# Patient Record
Sex: FEMALE | Race: WHITE | Employment: OTHER | URBAN - NONMETROPOLITAN AREA
[De-identification: names, ages, dates, MRNs, and addresses within clinical notes are randomized per-mention and may not be internally consistent; named-entity substitution may affect disease eponyms.]

---

## 2017-11-09 ENCOUNTER — HOSPITAL ENCOUNTER (EMERGENCY)
Age: 57
Discharge: HOME | End: 2017-11-09
Attending: EMERGENCY MEDICINE
Payer: COMMERCIAL

## 2017-11-09 ENCOUNTER — APPOINTMENT (OUTPATIENT)
Dept: GENERAL RADIOLOGY | Age: 57
End: 2017-11-09
Payer: COMMERCIAL

## 2017-11-09 VITALS
TEMPERATURE: 97.4 F | DIASTOLIC BLOOD PRESSURE: 88 MMHG | HEART RATE: 87 BPM | SYSTOLIC BLOOD PRESSURE: 145 MMHG | WEIGHT: 135 LBS | HEIGHT: 65 IN | BODY MASS INDEX: 22.49 KG/M2 | OXYGEN SATURATION: 97 % | RESPIRATION RATE: 18 BRPM

## 2017-11-09 DIAGNOSIS — M54.40 BILATERAL LOW BACK PAIN WITH SCIATICA, SCIATICA LATERALITY UNSPECIFIED, UNSPECIFIED CHRONICITY: ICD-10-CM

## 2017-11-09 DIAGNOSIS — R06.00 DYSPNEA, UNSPECIFIED TYPE: Primary | ICD-10-CM

## 2017-11-09 LAB
ALBUMIN SERPL-MCNC: 4.1 GM/DL (ref 3.5–5)
ALP BLD-CCNC: 69 U/L (ref 46–116)
ALT SERPL-CCNC: 29 U/L (ref 12–78)
ANION GAP: 10 MEQ/L (ref 8–16)
AST SERPL-CCNC: 22 U/L (ref 15–37)
BASOPHILS # BLD: 0.7 % (ref 0–3)
BILIRUB SERPL-MCNC: 0.3 MG/DL (ref 0.2–1)
BUN BLDV-MCNC: 13 MG/DL (ref 7–18)
CHLORIDE BLD-SCNC: 104 MEQ/L (ref 98–107)
CO2: 26 MEQ/L (ref 21–32)
CREAT SERPL-MCNC: 0.8 MG/DL (ref 0.6–1.1)
EKG ATRIAL RATE: 96 BPM
EKG P AXIS: 20 DEGREES
EKG P-R INTERVAL: 168 MS
EKG Q-T INTERVAL: 356 MS
EKG QRS DURATION: 70 MS
EKG QTC CALCULATION (BAZETT): 449 MS
EKG R AXIS: 80 DEGREES
EKG T AXIS: 39 DEGREES
EKG VENTRICULAR RATE: 96 BPM
EOSINOPHILS RELATIVE PERCENT: 3.3 % (ref 0–4)
FLU A ANTIGEN: NEGATIVE
FLU B ANTIGEN: NEGATIVE
GFR, ESTIMATED: 79 ML/MIN/1.73M2
GLUCOSE BLD-MCNC: 101 MG/DL (ref 74–106)
HCT VFR BLD CALC: 43.7 % (ref 37–47)
HEMOGLOBIN: 14.5 GM/DL (ref 12–16)
LYMPHOCYTES # BLD: 31.6 % (ref 15–47)
MAGNESIUM: 2.1 MG/DL (ref 1.8–2.4)
MCH RBC QN AUTO: 28.9 PG (ref 27–31)
MCHC RBC AUTO-ENTMCNC: 33.1 GM/DL (ref 33–37)
MCV RBC AUTO: 87.5 FL (ref 81–99)
MONOCYTES: 4.1 % (ref 0–12)
PDW BLD-RTO: 11.4 % (ref 11.5–14.5)
PLATELET # BLD: 390 THOU/MM3 (ref 130–400)
PMV BLD AUTO: 7.4 MCM (ref 7.4–10.4)
POC CALCIUM: 9.2 MG/DL (ref 8.5–10.1)
POTASSIUM SERPL-SCNC: 4.1 MEQ/L (ref 3.5–5.1)
RBC # BLD: 4.99 MILL/MM3 (ref 4.2–5.4)
SEGS: 60.3 % (ref 43–75)
SODIUM BLD-SCNC: 140 MEQ/L (ref 136–145)
TOTAL PROTEIN: 7.6 GM/DL (ref 6.4–8.2)
TROPONIN I: < 0.04 NG/ML
WBC # BLD: 10.1 THOU/MM3 (ref 4.8–10.8)

## 2017-11-09 PROCEDURE — 36415 COLL VENOUS BLD VENIPUNCTURE: CPT

## 2017-11-09 PROCEDURE — 84484 ASSAY OF TROPONIN QUANT: CPT

## 2017-11-09 PROCEDURE — 6370000000 HC RX 637 (ALT 250 FOR IP): Performed by: EMERGENCY MEDICINE

## 2017-11-09 PROCEDURE — 83735 ASSAY OF MAGNESIUM: CPT

## 2017-11-09 PROCEDURE — 93005 ELECTROCARDIOGRAM TRACING: CPT

## 2017-11-09 PROCEDURE — 2580000003 HC RX 258: Performed by: EMERGENCY MEDICINE

## 2017-11-09 PROCEDURE — 85025 COMPLETE CBC W/AUTO DIFF WBC: CPT

## 2017-11-09 PROCEDURE — 80053 COMPREHEN METABOLIC PANEL: CPT

## 2017-11-09 PROCEDURE — 96375 TX/PRO/DX INJ NEW DRUG ADDON: CPT

## 2017-11-09 PROCEDURE — 99285 EMERGENCY DEPT VISIT HI MDM: CPT

## 2017-11-09 PROCEDURE — 87804 INFLUENZA ASSAY W/OPTIC: CPT

## 2017-11-09 PROCEDURE — 71020 XR CHEST STANDARD TWO VW: CPT

## 2017-11-09 PROCEDURE — 96374 THER/PROPH/DIAG INJ IV PUSH: CPT

## 2017-11-09 PROCEDURE — 6360000002 HC RX W HCPCS: Performed by: EMERGENCY MEDICINE

## 2017-11-09 RX ORDER — MECLIZINE HYDROCHLORIDE 25 MG/1
25 TABLET ORAL 3 TIMES DAILY PRN
Qty: 15 TABLET | Refills: 0 | Status: SHIPPED | OUTPATIENT
Start: 2017-11-09 | End: 2017-11-19

## 2017-11-09 RX ORDER — PREDNISONE 20 MG/1
TABLET ORAL
Qty: 9 TABLET | Refills: 0 | Status: SHIPPED | OUTPATIENT
Start: 2017-11-09 | End: 2018-04-26

## 2017-11-09 RX ORDER — ONDANSETRON 2 MG/ML
4 INJECTION INTRAMUSCULAR; INTRAVENOUS ONCE
Status: COMPLETED | OUTPATIENT
Start: 2017-11-09 | End: 2017-11-09

## 2017-11-09 RX ORDER — ONDANSETRON 4 MG/1
4 TABLET, FILM COATED ORAL EVERY 8 HOURS PRN
Qty: 9 TABLET | Refills: 0 | Status: SHIPPED | OUTPATIENT
Start: 2017-11-09 | End: 2018-04-26

## 2017-11-09 RX ORDER — MECLIZINE HYDROCHLORIDE CHEWABLE TABLETS 25 MG/1
25 TABLET, CHEWABLE ORAL ONCE
Status: COMPLETED | OUTPATIENT
Start: 2017-11-09 | End: 2017-11-09

## 2017-11-09 RX ORDER — METHYLPREDNISOLONE SODIUM SUCCINATE 125 MG/2ML
125 INJECTION, POWDER, LYOPHILIZED, FOR SOLUTION INTRAMUSCULAR; INTRAVENOUS ONCE
Status: COMPLETED | OUTPATIENT
Start: 2017-11-09 | End: 2017-11-09

## 2017-11-09 RX ORDER — 0.9 % SODIUM CHLORIDE 0.9 %
1000 INTRAVENOUS SOLUTION INTRAVENOUS ONCE
Status: COMPLETED | OUTPATIENT
Start: 2017-11-09 | End: 2017-11-09

## 2017-11-09 RX ADMIN — METHYLPREDNISOLONE SODIUM SUCCINATE 125 MG: 125 INJECTION, POWDER, FOR SOLUTION INTRAMUSCULAR; INTRAVENOUS at 15:05

## 2017-11-09 RX ADMIN — MECLIZINE HCL 25 MG: 25 TABLET, CHEWABLE ORAL at 13:53

## 2017-11-09 RX ADMIN — ONDANSETRON 4 MG: 2 INJECTION INTRAMUSCULAR; INTRAVENOUS at 13:53

## 2017-11-09 RX ADMIN — SODIUM CHLORIDE 1000 ML: 9 INJECTION, SOLUTION INTRAVENOUS at 13:52

## 2017-11-09 ASSESSMENT — PAIN DESCRIPTION - DESCRIPTORS: DESCRIPTORS: ACHING

## 2017-11-09 ASSESSMENT — PAIN DESCRIPTION - ORIENTATION: ORIENTATION: LEFT

## 2017-11-09 ASSESSMENT — PAIN DESCRIPTION - LOCATION: LOCATION: RIB CAGE

## 2017-11-09 ASSESSMENT — ENCOUNTER SYMPTOMS
ABDOMINAL PAIN: 0
DIARRHEA: 0
SORE THROAT: 0
WHEEZING: 0
COUGH: 1
VOMITING: 1
NAUSEA: 1

## 2017-11-09 ASSESSMENT — PAIN DESCRIPTION - PAIN TYPE: TYPE: ACUTE PAIN

## 2017-11-09 ASSESSMENT — PAIN SCALES - GENERAL: PAINLEVEL_OUTOF10: 6

## 2017-11-09 NOTE — ED NOTES
Pt resting, resp even and unlabored, skin pink, warm and dry. Pt denies chest pressure at this time. IV discontinued, 1000ml's infused. Pt states she feels better. Pt given discharge instructions and verbalizes understanding, pt released.       Wiley George, RN  11/09/17 71 Angel Cuevas, RN  11/09/17 0363

## 2017-11-09 NOTE — ED PROVIDER NOTES
feel there is at least some component of narcotic withdrawal, as she has been on regular narcotic medication for over a year, and now has had none for 4-5 days. She disagrees. No other source of her symptoms has currently been found. I have encouraged her to rest, and remain well hydrated, and discuss her pain management with her PCP. She is experiencing increased low back pain now, and has been taking \"a lot of Ibuprofen and Aspirin\" for her pain. I cautioned her about excessive dosing of NSAID's. She doesn't want to take acetaminophen. I told her I would give her symptomatic care, and give her a short course of oral steroids for her low back pain. All other management per her doctor. FINAL IMPRESSION      1. Dyspnea, unspecified type    2. Bilateral low back pain with sciatica, sciatica laterality unspecified, unspecified chronicity        DISPOSITION/PLAN    DISPOSITION Decision to Discharge    PATIENT REFERRED TO:    Christine French [address]  [phone]    Schedule an appointment as soon as possible for a visit         DISCHARGE MEDICATIONS:    New Prescriptions    MECLIZINE (ANTIVERT) 25 MG TABLET    Take 1 tablet by mouth 3 times daily as needed for Dizziness    ONDANSETRON (ZOFRAN) 4 MG TABLET    Take 1 tablet by mouth every 8 hours as needed for Nausea or Vomiting    PREDNISONE (DELTASONE) 20 MG TABLET    2 daily for 3 days, then 1 daily for 3 days for inflammation.           (Please note that portions of this note were completed with a voice recognition program.  Efforts were made to edit the dictations but occasionally words are mis-transcribed.)      Francine Ortega MD  11/09/17 1289

## 2017-11-09 NOTE — ED NOTES
Pt complains of being \"unable to get her breath. \" Pt states she didn't feel well yesterday and today feels like she can't get her breath. Pt also complains of chest pressure, dizziness and l rib cage pain. Pt denies diaphoresis, fever, nausea or vomiting. Pt alert, resp even and unlabored, skin pale, warm and dry, no pedal edema noted.      Kelly Tucker RN  11/09/17 2536

## 2018-04-11 RX ORDER — METHYLPREDNISOLONE ACETATE 80 MG/ML
80 INJECTION, SUSPENSION INTRA-ARTICULAR; INTRALESIONAL; INTRAMUSCULAR; SOFT TISSUE ONCE
Status: CANCELLED | OUTPATIENT
Start: 2018-04-11 | End: 2018-04-11

## 2018-04-25 RX ORDER — METHYLPREDNISOLONE ACETATE 80 MG/ML
80 INJECTION, SUSPENSION INTRA-ARTICULAR; INTRALESIONAL; INTRAMUSCULAR; SOFT TISSUE ONCE
Status: CANCELLED | OUTPATIENT
Start: 2018-04-25 | End: 2018-04-25

## 2018-04-26 ENCOUNTER — HOSPITAL ENCOUNTER (OUTPATIENT)
Dept: INTERVENTIONAL RADIOLOGY/VASCULAR | Age: 58
Discharge: HOME | End: 2018-04-26
Payer: COMMERCIAL

## 2018-04-26 VITALS
TEMPERATURE: 97.7 F | BODY MASS INDEX: 23.46 KG/M2 | SYSTOLIC BLOOD PRESSURE: 122 MMHG | OXYGEN SATURATION: 98 % | WEIGHT: 141 LBS | HEART RATE: 76 BPM | DIASTOLIC BLOOD PRESSURE: 83 MMHG

## 2018-04-26 PROCEDURE — 2580000003 HC RX 258

## 2018-04-26 PROCEDURE — 6360000002 HC RX W HCPCS

## 2018-04-26 PROCEDURE — 6360000004 HC RX CONTRAST MEDICATION: Performed by: RADIOLOGY

## 2018-04-26 PROCEDURE — 2500000003 HC RX 250 WO HCPCS

## 2018-04-26 PROCEDURE — 62321 NJX INTERLAMINAR CRV/THRC: CPT | Performed by: RADIOLOGY

## 2018-04-26 RX ORDER — METHYLPREDNISOLONE ACETATE 80 MG/ML
80 INJECTION, SUSPENSION INTRA-ARTICULAR; INTRALESIONAL; INTRAMUSCULAR; SOFT TISSUE ONCE
Status: COMPLETED | OUTPATIENT
Start: 2018-04-26 | End: 2018-04-26

## 2018-04-26 RX ORDER — SERTRALINE HYDROCHLORIDE 25 MG/1
100 TABLET, FILM COATED ORAL DAILY
COMMUNITY

## 2018-04-26 RX ADMIN — METHYLPREDNISOLONE ACETATE 80 MG: 80 INJECTION, SUSPENSION INTRA-ARTICULAR; INTRALESIONAL; INTRAMUSCULAR; SOFT TISSUE at 08:27

## 2018-04-26 RX ADMIN — IOHEXOL 1 ML: 180 INJECTION INTRAVENOUS at 08:27

## 2018-04-26 RX ADMIN — Medication 1 ML: at 08:27

## 2018-04-26 ASSESSMENT — PAIN DESCRIPTION - DESCRIPTORS
DESCRIPTORS: TIGHTNESS
DESCRIPTORS: TIGHTNESS
DESCRIPTORS: CONSTANT;ACHING;STABBING;SHARP

## 2018-04-26 ASSESSMENT — PAIN SCALES - GENERAL: PAINLEVEL_OUTOF10: 2

## 2018-04-26 ASSESSMENT — PAIN DESCRIPTION - LOCATION
LOCATION: NECK;SHOULDER
LOCATION: NECK;SHOULDER

## 2018-04-26 ASSESSMENT — PAIN DESCRIPTION - PAIN TYPE
TYPE: CHRONIC PAIN
TYPE: CHRONIC PAIN

## 2018-04-26 ASSESSMENT — PAIN - FUNCTIONAL ASSESSMENT: PAIN_FUNCTIONAL_ASSESSMENT: 0-10

## 2018-04-26 ASSESSMENT — PAIN DESCRIPTION - ORIENTATION: ORIENTATION: RIGHT

## 2019-12-04 ENCOUNTER — HOSPITAL ENCOUNTER (OUTPATIENT)
Age: 59
Discharge: HOME | End: 2019-12-04
Payer: COMMERCIAL

## 2019-12-04 ENCOUNTER — HOSPITAL ENCOUNTER (OUTPATIENT)
Dept: GENERAL RADIOLOGY | Age: 59
Discharge: HOME | End: 2019-12-04
Payer: COMMERCIAL

## 2019-12-04 DIAGNOSIS — M43.16 SPONDYLOLISTHESIS AT L4-L5 LEVEL: ICD-10-CM

## 2019-12-04 DIAGNOSIS — Z01.811 PRE-OP CHEST EXAM: ICD-10-CM

## 2019-12-04 LAB
ALBUMIN SERPL-MCNC: 4.5 G/DL (ref 3.5–5.1)
ANION GAP SERPL CALCULATED.3IONS-SCNC: 12 MEQ/L (ref 8–16)
BASOPHILS # BLD: 0.8 %
BASOPHILS ABSOLUTE: 0.1 THOU/MM3 (ref 0–0.1)
BUN BLDV-MCNC: 14 MG/DL (ref 7–22)
CALCIUM SERPL-MCNC: 9.5 MG/DL (ref 8.5–10.5)
CHLORIDE BLD-SCNC: 100 MEQ/L (ref 98–111)
CO2: 27 MEQ/L (ref 23–33)
CREAT SERPL-MCNC: 0.8 MG/DL (ref 0.4–1.2)
EKG ATRIAL RATE: 82 BPM
EKG P AXIS: 63 DEGREES
EKG P-R INTERVAL: 200 MS
EKG Q-T INTERVAL: 366 MS
EKG QRS DURATION: 74 MS
EKG QTC CALCULATION (BAZETT): 427 MS
EKG R AXIS: 85 DEGREES
EKG T AXIS: 67 DEGREES
EKG VENTRICULAR RATE: 82 BPM
EOSINOPHIL # BLD: 3.7 %
EOSINOPHILS ABSOLUTE: 0.3 THOU/MM3 (ref 0–0.4)
ERYTHROCYTE [DISTWIDTH] IN BLOOD BY AUTOMATED COUNT: 12.6 % (ref 11.5–14.5)
ERYTHROCYTE [DISTWIDTH] IN BLOOD BY AUTOMATED COUNT: 42.8 FL (ref 35–45)
GFR SERPL CREATININE-BSD FRML MDRD: 74 ML/MIN/1.73M2
GLUCOSE BLD-MCNC: 91 MG/DL (ref 70–108)
HCT VFR BLD CALC: 44.4 % (ref 37–47)
HEMOGLOBIN: 14.1 GM/DL (ref 12–16)
IMMATURE GRANS (ABS): 0.02 THOU/MM3 (ref 0–0.07)
IMMATURE GRANULOCYTES: 0.2 %
LYMPHOCYTES # BLD: 36.2 %
LYMPHOCYTES ABSOLUTE: 3 THOU/MM3 (ref 1–4.8)
MCH RBC QN AUTO: 29.3 PG (ref 26–33)
MCHC RBC AUTO-ENTMCNC: 31.8 GM/DL (ref 32.2–35.5)
MCV RBC AUTO: 92.3 FL (ref 81–99)
MONOCYTES # BLD: 5 %
MONOCYTES ABSOLUTE: 0.4 THOU/MM3 (ref 0.4–1.3)
MRSA SCREEN RT-PCR: NEGATIVE
NUCLEATED RED BLOOD CELLS: 0 /100 WBC
PLATELET # BLD: 338 THOU/MM3 (ref 130–400)
PMV BLD AUTO: 9.3 FL (ref 9.4–12.4)
POTASSIUM SERPL-SCNC: 4.3 MEQ/L (ref 3.5–5.2)
PREALBUMIN: 32.4 MG/DL (ref 20–40)
RBC # BLD: 4.81 MILL/MM3 (ref 4.2–5.4)
SEG NEUTROPHILS: 54.1 %
SEGMENTED NEUTROPHILS ABSOLUTE COUNT: 4.5 THOU/MM3 (ref 1.8–7.7)
SODIUM BLD-SCNC: 139 MEQ/L (ref 135–145)
WBC # BLD: 8.3 THOU/MM3 (ref 4.8–10.8)

## 2019-12-04 PROCEDURE — 93010 ELECTROCARDIOGRAM REPORT: CPT | Performed by: INTERNAL MEDICINE

## 2019-12-04 PROCEDURE — 85025 COMPLETE CBC W/AUTO DIFF WBC: CPT

## 2019-12-04 PROCEDURE — 93005 ELECTROCARDIOGRAM TRACING: CPT | Performed by: PHYSICIAN ASSISTANT

## 2019-12-04 PROCEDURE — 84134 ASSAY OF PREALBUMIN: CPT

## 2019-12-04 PROCEDURE — 82040 ASSAY OF SERUM ALBUMIN: CPT

## 2019-12-04 PROCEDURE — 80048 BASIC METABOLIC PNL TOTAL CA: CPT

## 2019-12-04 PROCEDURE — 36415 COLL VENOUS BLD VENIPUNCTURE: CPT

## 2019-12-04 PROCEDURE — 71046 X-RAY EXAM CHEST 2 VIEWS: CPT

## 2019-12-04 PROCEDURE — 87641 MR-STAPH DNA AMP PROBE: CPT

## 2019-12-17 ENCOUNTER — HOSPITAL ENCOUNTER (EMERGENCY)
Age: 59
Discharge: HOME | DRG: 029 | End: 2019-12-17
Attending: EMERGENCY MEDICINE
Payer: COMMERCIAL

## 2019-12-17 VITALS
TEMPERATURE: 98.1 F | SYSTOLIC BLOOD PRESSURE: 97 MMHG | HEART RATE: 76 BPM | WEIGHT: 127 LBS | HEIGHT: 65 IN | BODY MASS INDEX: 21.16 KG/M2 | OXYGEN SATURATION: 94 % | RESPIRATION RATE: 16 BRPM | DIASTOLIC BLOOD PRESSURE: 77 MMHG

## 2019-12-17 PROCEDURE — 6360000002 HC RX W HCPCS: Performed by: EMERGENCY MEDICINE

## 2019-12-17 PROCEDURE — 6370000000 HC RX 637 (ALT 250 FOR IP): Performed by: EMERGENCY MEDICINE

## 2019-12-17 PROCEDURE — 96374 THER/PROPH/DIAG INJ IV PUSH: CPT

## 2019-12-17 PROCEDURE — 96372 THER/PROPH/DIAG INJ SC/IM: CPT

## 2019-12-17 PROCEDURE — 99284 EMERGENCY DEPT VISIT MOD MDM: CPT

## 2019-12-17 RX ORDER — DIPHENHYDRAMINE HYDROCHLORIDE 50 MG/ML
50 INJECTION INTRAMUSCULAR; INTRAVENOUS ONCE
Status: COMPLETED | OUTPATIENT
Start: 2019-12-17 | End: 2019-12-17

## 2019-12-17 RX ORDER — PROMETHAZINE HYDROCHLORIDE 25 MG/ML
6.25 INJECTION, SOLUTION INTRAMUSCULAR; INTRAVENOUS ONCE
Status: COMPLETED | OUTPATIENT
Start: 2019-12-17 | End: 2019-12-17

## 2019-12-17 RX ORDER — OXYCODONE HYDROCHLORIDE AND ACETAMINOPHEN 5; 325 MG/1; MG/1
1 TABLET ORAL ONCE
Status: COMPLETED | OUTPATIENT
Start: 2019-12-17 | End: 2019-12-17

## 2019-12-17 RX ORDER — MAGNESIUM SULFATE IN WATER 40 MG/ML
2 INJECTION, SOLUTION INTRAVENOUS ONCE
Status: COMPLETED | OUTPATIENT
Start: 2019-12-17 | End: 2019-12-17

## 2019-12-17 RX ORDER — DOCUSATE SODIUM 100 MG/1
100 CAPSULE, LIQUID FILLED ORAL 2 TIMES DAILY
COMMUNITY

## 2019-12-17 RX ADMIN — OXYCODONE HYDROCHLORIDE AND ACETAMINOPHEN 1 TABLET: 5; 325 TABLET ORAL at 19:38

## 2019-12-17 RX ADMIN — MAGNESIUM SULFATE HEPTAHYDRATE 2 G: 40 INJECTION, SOLUTION INTRAVENOUS at 17:34

## 2019-12-17 RX ADMIN — PROMETHAZINE HYDROCHLORIDE 6.25 MG: 25 INJECTION INTRAMUSCULAR; INTRAVENOUS at 17:14

## 2019-12-17 RX ADMIN — DIPHENHYDRAMINE HYDROCHLORIDE 50 MG: 50 INJECTION INTRAMUSCULAR; INTRAVENOUS at 17:11

## 2019-12-17 ASSESSMENT — ENCOUNTER SYMPTOMS
PHOTOPHOBIA: 1
BLOOD IN STOOL: 0
DIARRHEA: 0
SINUS PRESSURE: 0
EYE PAIN: 0
SINUS PAIN: 0
NAUSEA: 1
ABDOMINAL PAIN: 0
CONSTIPATION: 0
CHEST TIGHTNESS: 0
BACK PAIN: 0
SHORTNESS OF BREATH: 0
COUGH: 0
RECTAL PAIN: 0
VOMITING: 1

## 2019-12-17 ASSESSMENT — PAIN DESCRIPTION - PAIN TYPE: TYPE: ACUTE PAIN

## 2019-12-17 ASSESSMENT — PAIN SCALES - GENERAL
PAINLEVEL_OUTOF10: 9
PAINLEVEL_OUTOF10: 4

## 2019-12-17 ASSESSMENT — PAIN DESCRIPTION - LOCATION: LOCATION: BACK;HEAD

## 2019-12-17 NOTE — ED NOTES
Bed: 016A  Expected date: 12/17/19  Expected time:   Means of arrival:   Comments:      Karen Joshua RN  12/17/19 2521

## 2019-12-17 NOTE — ED PROVIDER NOTES
file (likely too young). Social History Narrative    Not on file     Social History     Tobacco Use    Smoking status: Current Some Day Smoker     Packs/day: 0.50     Types: Cigarettes    Smokeless tobacco: Never Used    Tobacco comment: 1/2 pack to 1 pack a day    Substance Use Topics    Alcohol use: Yes     Alcohol/week: 0.0 standard drinks     Comment: Socially    Drug use: No         ALLERGIES     Allergies   Allergen Reactions    Erythromycin      ABD CRAMPS    Pcn [Penicillins]          FAMILY HISTORY     Family History   Problem Relation Age of Onset    Cancer Mother         Sol Vizcarra Cancer Father         prostate    Heart Attack Father          PREVIOUS RECORDS   Previous records reviewed: Patient was last seen within the department on 11/09/2017 for dyspnea. PHYSICAL EXAM     Vitals:    12/17/19 1723   BP: 111/78   Pulse: 83   Resp: 16   Temp:    SpO2: 92%     Vital signs and nursing assessment reviewed and normal. Pulsoximetry is normal per my interpretation. Physical Exam  Vitals signs and nursing note reviewed. Constitutional:       General: She is not in acute distress. Appearance: Normal appearance. She is well-developed. HENT:      Head: Normocephalic and atraumatic. Right Ear: External ear normal.      Left Ear: External ear normal.      Nose: Nose normal.      Mouth/Throat:      Mouth: Mucous membranes are moist.   Eyes:      Conjunctiva/sclera: Conjunctivae normal.      Pupils: Pupils are equal, round, and reactive to light. Neck:      Musculoskeletal: Neck supple. Vascular: No JVD. Cardiovascular:      Rate and Rhythm: Normal rate and regular rhythm. Heart sounds: Normal heart sounds. No murmur. No friction rub. No gallop. Pulmonary:      Effort: Pulmonary effort is normal.      Breath sounds: Normal breath sounds. No stridor. No wheezing or rales.    Musculoskeletal:      Comments: Patient has a sterile dressing on her lumbar spine without

## 2019-12-17 NOTE — ED TRIAGE NOTES
Pt presents to the ED via EMS with c/o migraine that has progressively gotten worse since Sunday. Pt had lumbar back surgery last week and has two wound drains. Pt states she has been vomiting since the head ache. Pt breathing easy and unlabored. Pt alert and oriented. Pt has IV PTA via EMS. Vitals obtained. Pt has bandage on her lower back that does not appear to be leaking without removing the bandage. Pt made comfortable in cot. Lights off and door shut for comfort.

## 2019-12-18 NOTE — ED NOTES
Pt moved to recliner in room for pt comfort. Pt medicated with Percocet at this time for back pain. Pt waiting on back brace to arrive from [facility]. Pt has family at bedside.       Tete Vincent RN  12/17/19 1941

## 2019-12-18 NOTE — ED NOTES
Pt magnesium running at this time. Will continue to monitor pt.       Danyell Garcia RN  12/17/19 4705

## 2019-12-18 NOTE — ED NOTES
Pt states that the only pain she is having is in her back, but her head does not hurt anymore. Jordana BUENROSTRO spoke with RN and they have pt back brace. They told RN that they may not make it here tonight.       Juan F Mcpherson RN  12/17/19 1737

## 2019-12-19 ENCOUNTER — HOSPITAL ENCOUNTER (INPATIENT)
Age: 59
LOS: 10 days | Discharge: HOME HEALTH | DRG: 029 | End: 2019-12-29
Attending: ORTHOPAEDIC SURGERY | Admitting: ORTHOPAEDIC SURGERY
Payer: COMMERCIAL

## 2019-12-19 PROBLEM — M48.00 SPINAL STENOSIS: Status: ACTIVE | Noted: 2019-12-19

## 2019-12-19 PROCEDURE — 6360000002 HC RX W HCPCS: Performed by: PHYSICIAN ASSISTANT

## 2019-12-19 PROCEDURE — 1200000000 HC SEMI PRIVATE

## 2019-12-19 PROCEDURE — 6370000000 HC RX 637 (ALT 250 FOR IP): Performed by: PHYSICIAN ASSISTANT

## 2019-12-19 PROCEDURE — 2580000003 HC RX 258: Performed by: PHYSICIAN ASSISTANT

## 2019-12-19 PROCEDURE — 6360000002 HC RX W HCPCS

## 2019-12-19 PROCEDURE — 2709999900 HC NON-CHARGEABLE SUPPLY

## 2019-12-19 PROCEDURE — 94760 N-INVAS EAR/PLS OXIMETRY 1: CPT

## 2019-12-19 RX ORDER — LABETALOL 100 MG/1
100 TABLET, FILM COATED ORAL DAILY
Status: DISCONTINUED | OUTPATIENT
Start: 2019-12-19 | End: 2019-12-29 | Stop reason: HOSPADM

## 2019-12-19 RX ORDER — GABAPENTIN 300 MG/1
300 CAPSULE ORAL NIGHTLY
Status: DISCONTINUED | OUTPATIENT
Start: 2019-12-19 | End: 2019-12-21

## 2019-12-19 RX ORDER — ONDANSETRON 2 MG/ML
4 INJECTION INTRAMUSCULAR; INTRAVENOUS EVERY 6 HOURS PRN
Status: DISCONTINUED | OUTPATIENT
Start: 2019-12-19 | End: 2019-12-19

## 2019-12-19 RX ORDER — ONDANSETRON 2 MG/ML
4 INJECTION INTRAMUSCULAR; INTRAVENOUS EVERY 6 HOURS PRN
Status: DISCONTINUED | OUTPATIENT
Start: 2019-12-19 | End: 2019-12-21 | Stop reason: SDUPTHER

## 2019-12-19 RX ORDER — SODIUM CHLORIDE 0.9 % (FLUSH) 0.9 %
10 SYRINGE (ML) INJECTION PRN
Status: DISCONTINUED | OUTPATIENT
Start: 2019-12-19 | End: 2019-12-21

## 2019-12-19 RX ORDER — OXYCODONE AND ACETAMINOPHEN 10; 325 MG/1; MG/1
1 TABLET ORAL EVERY 4 HOURS PRN
Status: DISCONTINUED | OUTPATIENT
Start: 2019-12-19 | End: 2019-12-26

## 2019-12-19 RX ORDER — ONDANSETRON 2 MG/ML
INJECTION INTRAMUSCULAR; INTRAVENOUS
Status: COMPLETED
Start: 2019-12-19 | End: 2019-12-19

## 2019-12-19 RX ORDER — DOCUSATE SODIUM 100 MG/1
100 CAPSULE, LIQUID FILLED ORAL 2 TIMES DAILY
Status: DISCONTINUED | OUTPATIENT
Start: 2019-12-19 | End: 2019-12-21

## 2019-12-19 RX ORDER — SODIUM CHLORIDE 0.9 % (FLUSH) 0.9 %
10 SYRINGE (ML) INJECTION EVERY 12 HOURS SCHEDULED
Status: DISCONTINUED | OUTPATIENT
Start: 2019-12-19 | End: 2019-12-21

## 2019-12-19 RX ORDER — PROMETHAZINE HYDROCHLORIDE 25 MG/1
25 TABLET ORAL EVERY 6 HOURS PRN
Status: DISCONTINUED | OUTPATIENT
Start: 2019-12-19 | End: 2019-12-19

## 2019-12-19 RX ORDER — PROMETHAZINE HYDROCHLORIDE 25 MG/ML
25 INJECTION, SOLUTION INTRAMUSCULAR; INTRAVENOUS EVERY 6 HOURS PRN
Status: DISCONTINUED | OUTPATIENT
Start: 2019-12-19 | End: 2019-12-29 | Stop reason: HOSPADM

## 2019-12-19 RX ORDER — CYCLOBENZAPRINE HCL 10 MG
10 TABLET ORAL PRN
Status: DISCONTINUED | OUTPATIENT
Start: 2019-12-19 | End: 2019-12-21

## 2019-12-19 RX ADMIN — HYDROMORPHONE HYDROCHLORIDE 0.25 MG: 1 INJECTION, SOLUTION INTRAMUSCULAR; INTRAVENOUS; SUBCUTANEOUS at 15:06

## 2019-12-19 RX ADMIN — HYDROMORPHONE HYDROCHLORIDE 0.5 MG: 1 INJECTION, SOLUTION INTRAMUSCULAR; INTRAVENOUS; SUBCUTANEOUS at 11:57

## 2019-12-19 RX ADMIN — SODIUM CHLORIDE, PRESERVATIVE FREE 10 ML: 5 INJECTION INTRAVENOUS at 11:42

## 2019-12-19 RX ADMIN — SALINE NASAL SPRAY 1 SPRAY: 1.5 SOLUTION NASAL at 21:30

## 2019-12-19 RX ADMIN — SODIUM CHLORIDE, PRESERVATIVE FREE 10 ML: 5 INJECTION INTRAVENOUS at 21:30

## 2019-12-19 RX ADMIN — ONDANSETRON 4 MG: 2 INJECTION INTRAMUSCULAR; INTRAVENOUS at 21:31

## 2019-12-19 RX ADMIN — ONDANSETRON: 2 INJECTION INTRAMUSCULAR; INTRAVENOUS at 11:42

## 2019-12-19 RX ADMIN — PROMETHAZINE HYDROCHLORIDE 25 MG: 25 INJECTION INTRAMUSCULAR; INTRAVENOUS at 22:56

## 2019-12-19 RX ADMIN — HYDROMORPHONE HYDROCHLORIDE 0.25 MG: 1 INJECTION, SOLUTION INTRAMUSCULAR; INTRAVENOUS; SUBCUTANEOUS at 18:10

## 2019-12-19 RX ADMIN — ONDANSETRON 4 MG: 2 INJECTION INTRAMUSCULAR; INTRAVENOUS at 18:10

## 2019-12-19 RX ADMIN — PROMETHAZINE HYDROCHLORIDE 25 MG: 25 TABLET ORAL at 12:35

## 2019-12-19 RX ADMIN — HYDROMORPHONE HYDROCHLORIDE 0.25 MG: 1 INJECTION, SOLUTION INTRAMUSCULAR; INTRAVENOUS; SUBCUTANEOUS at 21:32

## 2019-12-19 RX ADMIN — GABAPENTIN 300 MG: 300 CAPSULE ORAL at 21:41

## 2019-12-19 RX ADMIN — OXYCODONE HYDROCHLORIDE AND ACETAMINOPHEN 1 TABLET: 10; 325 TABLET ORAL at 12:35

## 2019-12-19 ASSESSMENT — PAIN DESCRIPTION - PROGRESSION: CLINICAL_PROGRESSION: NOT CHANGED

## 2019-12-19 ASSESSMENT — PAIN SCALES - GENERAL
PAINLEVEL_OUTOF10: 10
PAINLEVEL_OUTOF10: 10
PAINLEVEL_OUTOF10: 6
PAINLEVEL_OUTOF10: 5

## 2019-12-19 ASSESSMENT — PAIN DESCRIPTION - DESCRIPTORS: DESCRIPTORS: SHARP;SHOOTING

## 2019-12-19 ASSESSMENT — PAIN DESCRIPTION - LOCATION: LOCATION: HEAD;BACK

## 2019-12-19 ASSESSMENT — PAIN DESCRIPTION - ORIENTATION: ORIENTATION: LEFT;RIGHT

## 2019-12-19 ASSESSMENT — PAIN DESCRIPTION - PAIN TYPE: TYPE: ACUTE PAIN

## 2019-12-19 ASSESSMENT — PAIN DESCRIPTION - FREQUENCY: FREQUENCY: CONTINUOUS

## 2019-12-19 ASSESSMENT — PAIN - FUNCTIONAL ASSESSMENT: PAIN_FUNCTIONAL_ASSESSMENT: PREVENTS OR INTERFERES SOME ACTIVE ACTIVITIES AND ADLS

## 2019-12-19 ASSESSMENT — PAIN DESCRIPTION - ONSET: ONSET: ON-GOING

## 2019-12-20 PROCEDURE — 6360000002 HC RX W HCPCS: Performed by: PHYSICIAN ASSISTANT

## 2019-12-20 PROCEDURE — 99255 IP/OBS CONSLTJ NEW/EST HI 80: CPT | Performed by: PHYSICIAN ASSISTANT

## 2019-12-20 PROCEDURE — 1200000000 HC SEMI PRIVATE

## 2019-12-20 PROCEDURE — 6370000000 HC RX 637 (ALT 250 FOR IP): Performed by: PHYSICIAN ASSISTANT

## 2019-12-20 PROCEDURE — 2580000003 HC RX 258: Performed by: PHYSICIAN ASSISTANT

## 2019-12-20 RX ORDER — KETOROLAC TROMETHAMINE 30 MG/ML
30 INJECTION, SOLUTION INTRAMUSCULAR; INTRAVENOUS EVERY 6 HOURS PRN
Status: DISCONTINUED | OUTPATIENT
Start: 2019-12-20 | End: 2019-12-21

## 2019-12-20 RX ADMIN — HYDROMORPHONE HYDROCHLORIDE 0.5 MG: 1 INJECTION, SOLUTION INTRAMUSCULAR; INTRAVENOUS; SUBCUTANEOUS at 14:48

## 2019-12-20 RX ADMIN — OXYCODONE HYDROCHLORIDE AND ACETAMINOPHEN 1 TABLET: 10; 325 TABLET ORAL at 13:01

## 2019-12-20 RX ADMIN — HYDROMORPHONE HYDROCHLORIDE 0.25 MG: 1 INJECTION, SOLUTION INTRAMUSCULAR; INTRAVENOUS; SUBCUTANEOUS at 11:38

## 2019-12-20 RX ADMIN — KETOROLAC TROMETHAMINE 30 MG: 30 INJECTION, SOLUTION INTRAMUSCULAR at 11:39

## 2019-12-20 RX ADMIN — DOCUSATE SODIUM 100 MG: 100 CAPSULE, LIQUID FILLED ORAL at 20:59

## 2019-12-20 RX ADMIN — HYDROMORPHONE HYDROCHLORIDE 0.5 MG: 1 INJECTION, SOLUTION INTRAMUSCULAR; INTRAVENOUS; SUBCUTANEOUS at 20:59

## 2019-12-20 RX ADMIN — HYDROMORPHONE HYDROCHLORIDE 0.5 MG: 1 INJECTION, SOLUTION INTRAMUSCULAR; INTRAVENOUS; SUBCUTANEOUS at 04:01

## 2019-12-20 RX ADMIN — PROMETHAZINE HYDROCHLORIDE 25 MG: 25 INJECTION INTRAMUSCULAR; INTRAVENOUS at 14:49

## 2019-12-20 RX ADMIN — OXYCODONE HYDROCHLORIDE AND ACETAMINOPHEN 1 TABLET: 10; 325 TABLET ORAL at 18:17

## 2019-12-20 RX ADMIN — PROMETHAZINE HYDROCHLORIDE 25 MG: 25 INJECTION INTRAMUSCULAR; INTRAVENOUS at 21:00

## 2019-12-20 RX ADMIN — GABAPENTIN 300 MG: 300 CAPSULE ORAL at 20:58

## 2019-12-20 RX ADMIN — KETOROLAC TROMETHAMINE 30 MG: 30 INJECTION, SOLUTION INTRAMUSCULAR at 18:17

## 2019-12-20 RX ADMIN — ONDANSETRON 4 MG: 2 INJECTION INTRAMUSCULAR; INTRAVENOUS at 18:17

## 2019-12-20 RX ADMIN — PROMETHAZINE HYDROCHLORIDE 25 MG: 25 INJECTION INTRAMUSCULAR; INTRAVENOUS at 08:00

## 2019-12-20 RX ADMIN — SODIUM CHLORIDE, PRESERVATIVE FREE 10 ML: 5 INJECTION INTRAVENOUS at 21:00

## 2019-12-20 RX ADMIN — ONDANSETRON 4 MG: 2 INJECTION INTRAMUSCULAR; INTRAVENOUS at 04:02

## 2019-12-20 RX ADMIN — ONDANSETRON 4 MG: 2 INJECTION INTRAMUSCULAR; INTRAVENOUS at 10:32

## 2019-12-20 RX ADMIN — SODIUM CHLORIDE, PRESERVATIVE FREE 10 ML: 5 INJECTION INTRAVENOUS at 08:01

## 2019-12-20 RX ADMIN — HYDROMORPHONE HYDROCHLORIDE 0.25 MG: 1 INJECTION, SOLUTION INTRAMUSCULAR; INTRAVENOUS; SUBCUTANEOUS at 08:00

## 2019-12-20 RX ADMIN — HYDROMORPHONE HYDROCHLORIDE 0.5 MG: 1 INJECTION, SOLUTION INTRAMUSCULAR; INTRAVENOUS; SUBCUTANEOUS at 00:42

## 2019-12-20 ASSESSMENT — PAIN DESCRIPTION - PROGRESSION
CLINICAL_PROGRESSION: NOT CHANGED
CLINICAL_PROGRESSION: GRADUALLY IMPROVING
CLINICAL_PROGRESSION: NOT CHANGED
CLINICAL_PROGRESSION: GRADUALLY IMPROVING
CLINICAL_PROGRESSION: NOT CHANGED
CLINICAL_PROGRESSION: NOT CHANGED

## 2019-12-20 ASSESSMENT — PAIN DESCRIPTION - PAIN TYPE
TYPE: ACUTE PAIN

## 2019-12-20 ASSESSMENT — PAIN SCALES - GENERAL
PAINLEVEL_OUTOF10: 10
PAINLEVEL_OUTOF10: 9
PAINLEVEL_OUTOF10: 10
PAINLEVEL_OUTOF10: 10
PAINLEVEL_OUTOF10: 8
PAINLEVEL_OUTOF10: 10
PAINLEVEL_OUTOF10: 7
PAINLEVEL_OUTOF10: 8

## 2019-12-20 ASSESSMENT — PAIN DESCRIPTION - ORIENTATION
ORIENTATION: RIGHT;LEFT

## 2019-12-20 ASSESSMENT — PAIN DESCRIPTION - DESCRIPTORS
DESCRIPTORS: ACHING
DESCRIPTORS: SHARP;SHOOTING
DESCRIPTORS: SHARP;SHOOTING

## 2019-12-20 ASSESSMENT — PAIN DESCRIPTION - LOCATION
LOCATION: HEAD

## 2019-12-20 ASSESSMENT — PAIN DESCRIPTION - FREQUENCY
FREQUENCY: CONTINUOUS

## 2019-12-20 ASSESSMENT — PAIN DESCRIPTION - ONSET: ONSET: ON-GOING

## 2019-12-20 ASSESSMENT — ENCOUNTER SYMPTOMS
VOMITING: 1
EYES NEGATIVE: 1
ALLERGIC/IMMUNOLOGIC NEGATIVE: 1
NAUSEA: 1
BACK PAIN: 1
RESPIRATORY NEGATIVE: 1

## 2019-12-20 ASSESSMENT — PAIN - FUNCTIONAL ASSESSMENT: PAIN_FUNCTIONAL_ASSESSMENT: PREVENTS OR INTERFERES SOME ACTIVE ACTIVITIES AND ADLS

## 2019-12-20 NOTE — CONSULTS
INITIAL CONSULTATION    Chief Complaint  No chief complaint on file. Current Status/Solomon  Consulted for headaches. Patient was directed admitted by orthopedics for nausea and vomiting with headache. Patient is POD 7 lumbar laminectomy. Patient has been having headaches and was evaluated in the ED a few days prior to admission. Patient denies fevers, chills. Patient has been vomiting. There is no diarrhea. Patient denies paresthesias, loss of control of bladder or bowel. Past Medical History Complicated by  Past Medical History:   Diagnosis Date    Anxiety     Back pain     Bulging lumbar disc     3,4,5    Hypertension     Migraine        Past Surgical History  Past Surgical History:   Procedure Laterality Date    APPENDECTOMY      BACK SURGERY  2004    Disection fusion and revision of 3,4,5,6 X 2 2004,2008    CYST REMOVAL  2013    Cyst remobal and flopoian tube removal    HYSTERECTOMY      NECK SURGERY      X2        Family History  Family History   Problem Relation Age of Onset    Cancer Mother         Nikko Huber Cancer Father         prostate    Heart Attack Father        Social History  Social History     Socioeconomic History    Marital status:      Spouse name: None    Number of children: None    Years of education: None    Highest education level: None   Occupational History    None   Social Needs    Financial resource strain: None    Food insecurity:     Worry: None     Inability: None    Transportation needs:     Medical: None     Non-medical: None   Tobacco Use    Smoking status: Current Some Day Smoker     Packs/day: 0.50     Types: Cigarettes    Smokeless tobacco: Never Used    Tobacco comment: 1/2 pack to 1 pack a day    Substance and Sexual Activity    Alcohol use:  Yes     Alcohol/week: 0.0 standard drinks     Comment: Socially    Drug use: No    Sexual activity: None   Lifestyle    Physical activity:     Days per week: None     Minutes per session: None    Stress: None   Relationships    Social connections:     Talks on phone: None     Gets together: None     Attends Holiness service: None     Active member of club or organization: None     Attends meetings of clubs or organizations: None     Relationship status: None    Intimate partner violence:     Fear of current or ex partner: None     Emotionally abused: None     Physically abused: None     Forced sexual activity: None   Other Topics Concern    None   Social History Narrative    None       Allergies  Allergies   Allergen Reactions    Erythromycin      ABD CRAMPS    Pcn [Penicillins]        Current Medications  Current Facility-Administered Medications   Medication Dose Route Frequency Provider Last Rate Last Dose    cyclobenzaprine (FLEXERIL) tablet 10 mg  10 mg Oral PRN Casandra Scott, PA-C        docusate sodium (COLACE) capsule 100 mg  100 mg Oral BID Casandra Scott, PA-C        gabapentin (NEURONTIN) capsule 300 mg  300 mg Oral Nightly Casandra Mensahin, PA-C   300 mg at 12/19/19 2141    labetalol (NORMODYNE) tablet 100 mg  100 mg Oral Daily Casandra Scott PA-C        oxyCODONE-acetaminophen (PERCOCET)  MG per tablet 1 tablet  1 tablet Oral Q4H PRN SUSANNE ChowC   1 tablet at 12/19/19 1235    sertraline (ZOLOFT) tablet 25 mg  25 mg Oral Daily Casandra Scott, PA-C        sodium chloride (OCEAN, BABY AYR) 0.65 % nasal spray 1 spray  1 spray Nasal BID Brit Scott, PA-C   1 spray at 12/19/19 2130    sodium chloride flush 0.9 % injection 10 mL  10 mL Intravenous 2 times per day LUKAS Chow-C   10 mL at 12/19/19 2130    sodium chloride flush 0.9 % injection 10 mL  10 mL Intravenous PRN Casandra Scott, PA-C        magnesium hydroxide (MILK OF MAGNESIA) 400 MG/5ML suspension 30 mL  30 mL Oral Daily PRN Casandra Scott, PA-C        HYDROmorphone (DILAUDID) injection 0.25 mg  0.25 mg Intravenous Q3H PRN Brit Pila Sonia, PA-C   0.25 mg at 12/19/19 2132 content normal.         Judgment: Judgment normal.         Assessment  1. Headaches post lumbar laminectomy - primary to see the patient for imaging to rule out dural leak. Pain control, IV hydration.     Bonnie Beatty PA-C

## 2019-12-20 NOTE — PROGRESS NOTES
3739-This RN spoke with Jagdish Daily for Dr. Jamal Smith regarding patient experiencing nausea/vomiting. PRN IV zofran already given without effectiveness, currently has phenergan ordered, however is ordered as oral, which patient is unable to tolerate. Order received for 25 mg Phenergan IM every 6 hours as needed for nausea/vomiting. 36-This RN spoke with Jagdish Daily for Dr. Jamal Smith regarding patient's continued severe nausea/vomiting even after IV zofran and IM phenergan. Patient complains of severe headache pain as well as severe nausea. New order received for consult to hospitalist for headaches. 0100-call placed to Dr. Elisha Jackson regarding consult. No answer, message left. Waiting for response. 0600-This RN spoke with Marce Infante with hospitalist group regarding consult. Nila told this RN she had been up to unit recently to see patient, just has not dictated note yet, will be doing so soon.

## 2019-12-20 NOTE — PROGRESS NOTES
Pt was admitted early this AM (12/20) by Ortho and IM was consulted due to nausea and vomiting. Spoke with Chung Mota (Ortho Spine PA) who believes the plan will be to bring the patient back to the OR. He was going to talk to Dr. Roque Winters.      Electronically signed by LUKAS Smith on 12/20/2019 at 11:22 AM

## 2019-12-21 ENCOUNTER — ANESTHESIA EVENT (OUTPATIENT)
Dept: OPERATING ROOM | Age: 59
DRG: 029 | End: 2019-12-21
Payer: COMMERCIAL

## 2019-12-21 ENCOUNTER — ANESTHESIA (OUTPATIENT)
Dept: OPERATING ROOM | Age: 59
DRG: 029 | End: 2019-12-21
Payer: COMMERCIAL

## 2019-12-21 VITALS
DIASTOLIC BLOOD PRESSURE: 63 MMHG | OXYGEN SATURATION: 100 % | SYSTOLIC BLOOD PRESSURE: 105 MMHG | RESPIRATION RATE: 24 BRPM | TEMPERATURE: 98.8 F

## 2019-12-21 PROBLEM — E44.0 MODERATE MALNUTRITION (HCC): Status: ACTIVE | Noted: 2019-12-21

## 2019-12-21 LAB
ANION GAP SERPL CALCULATED.3IONS-SCNC: 13 MEQ/L (ref 8–16)
BUN BLDV-MCNC: 25 MG/DL (ref 7–22)
CALCIUM SERPL-MCNC: 8.8 MG/DL (ref 8.5–10.5)
CHLORIDE BLD-SCNC: 102 MEQ/L (ref 98–111)
CO2: 23 MEQ/L (ref 23–33)
CREAT SERPL-MCNC: 0.5 MG/DL (ref 0.4–1.2)
ERYTHROCYTE [DISTWIDTH] IN BLOOD BY AUTOMATED COUNT: 13.2 % (ref 11.5–14.5)
ERYTHROCYTE [DISTWIDTH] IN BLOOD BY AUTOMATED COUNT: 44 FL (ref 35–45)
GFR SERPL CREATININE-BSD FRML MDRD: > 90 ML/MIN/1.73M2
GLUCOSE BLD-MCNC: 103 MG/DL (ref 70–108)
HCT VFR BLD CALC: 35.2 % (ref 37–47)
HEMOGLOBIN: 11.3 GM/DL (ref 12–16)
MAGNESIUM: 2.3 MG/DL (ref 1.6–2.4)
MCH RBC QN AUTO: 29.6 PG (ref 26–33)
MCHC RBC AUTO-ENTMCNC: 32.1 GM/DL (ref 32.2–35.5)
MCV RBC AUTO: 92.1 FL (ref 81–99)
PLATELET # BLD: 435 THOU/MM3 (ref 130–400)
PMV BLD AUTO: 8.6 FL (ref 9.4–12.4)
POTASSIUM REFLEX MAGNESIUM: 3.5 MEQ/L (ref 3.5–5.2)
RBC # BLD: 3.82 MILL/MM3 (ref 4.2–5.4)
SODIUM BLD-SCNC: 138 MEQ/L (ref 135–145)
WBC # BLD: 10.6 THOU/MM3 (ref 4.8–10.8)

## 2019-12-21 PROCEDURE — 6360000002 HC RX W HCPCS: Performed by: PHYSICIAN ASSISTANT

## 2019-12-21 PROCEDURE — 2500000003 HC RX 250 WO HCPCS: Performed by: ORTHOPAEDIC SURGERY

## 2019-12-21 PROCEDURE — 85027 COMPLETE CBC AUTOMATED: CPT

## 2019-12-21 PROCEDURE — 3700000000 HC ANESTHESIA ATTENDED CARE: Performed by: ORTHOPAEDIC SURGERY

## 2019-12-21 PROCEDURE — 2580000003 HC RX 258: Performed by: PHYSICIAN ASSISTANT

## 2019-12-21 PROCEDURE — 6370000000 HC RX 637 (ALT 250 FOR IP): Performed by: PHYSICIAN ASSISTANT

## 2019-12-21 PROCEDURE — 7100000001 HC PACU RECOVERY - ADDTL 15 MIN: Performed by: ORTHOPAEDIC SURGERY

## 2019-12-21 PROCEDURE — 83735 ASSAY OF MAGNESIUM: CPT

## 2019-12-21 PROCEDURE — 2500000003 HC RX 250 WO HCPCS: Performed by: NURSE ANESTHETIST, CERTIFIED REGISTERED

## 2019-12-21 PROCEDURE — 2580000003 HC RX 258: Performed by: NURSE ANESTHETIST, CERTIFIED REGISTERED

## 2019-12-21 PROCEDURE — 2709999900 HC NON-CHARGEABLE SUPPLY: Performed by: ORTHOPAEDIC SURGERY

## 2019-12-21 PROCEDURE — 6360000002 HC RX W HCPCS: Performed by: NURSE ANESTHETIST, CERTIFIED REGISTERED

## 2019-12-21 PROCEDURE — 94760 N-INVAS EAR/PLS OXIMETRY 1: CPT

## 2019-12-21 PROCEDURE — 7100000000 HC PACU RECOVERY - FIRST 15 MIN: Performed by: ORTHOPAEDIC SURGERY

## 2019-12-21 PROCEDURE — 3600000004 HC SURGERY LEVEL 4 BASE: Performed by: ORTHOPAEDIC SURGERY

## 2019-12-21 PROCEDURE — 3600000014 HC SURGERY LEVEL 4 ADDTL 15MIN: Performed by: ORTHOPAEDIC SURGERY

## 2019-12-21 PROCEDURE — 1200000000 HC SEMI PRIVATE

## 2019-12-21 PROCEDURE — 36415 COLL VENOUS BLD VENIPUNCTURE: CPT

## 2019-12-21 PROCEDURE — 00QT0ZZ REPAIR SPINAL MENINGES, OPEN APPROACH: ICD-10-PCS | Performed by: ORTHOPAEDIC SURGERY

## 2019-12-21 PROCEDURE — 2720000010 HC SURG SUPPLY STERILE: Performed by: ORTHOPAEDIC SURGERY

## 2019-12-21 PROCEDURE — 3700000001 HC ADD 15 MINUTES (ANESTHESIA): Performed by: ORTHOPAEDIC SURGERY

## 2019-12-21 PROCEDURE — 80048 BASIC METABOLIC PNL TOTAL CA: CPT

## 2019-12-21 RX ORDER — PROPOFOL 10 MG/ML
INJECTION, EMULSION INTRAVENOUS PRN
Status: DISCONTINUED | OUTPATIENT
Start: 2019-12-21 | End: 2019-12-21 | Stop reason: SDUPTHER

## 2019-12-21 RX ORDER — MORPHINE SULFATE 2 MG/ML
2 INJECTION, SOLUTION INTRAMUSCULAR; INTRAVENOUS EVERY 5 MIN PRN
Status: DISCONTINUED | OUTPATIENT
Start: 2019-12-21 | End: 2019-12-21 | Stop reason: HOSPADM

## 2019-12-21 RX ORDER — FENTANYL CITRATE 50 UG/ML
50 INJECTION, SOLUTION INTRAMUSCULAR; INTRAVENOUS EVERY 5 MIN PRN
Status: DISCONTINUED | OUTPATIENT
Start: 2019-12-21 | End: 2019-12-21 | Stop reason: HOSPADM

## 2019-12-21 RX ORDER — CEFAZOLIN SODIUM 1 G/3ML
INJECTION, POWDER, FOR SOLUTION INTRAMUSCULAR; INTRAVENOUS PRN
Status: DISCONTINUED | OUTPATIENT
Start: 2019-12-21 | End: 2019-12-21 | Stop reason: SDUPTHER

## 2019-12-21 RX ORDER — LIDOCAINE HYDROCHLORIDE AND EPINEPHRINE 10; 10 MG/ML; UG/ML
INJECTION, SOLUTION INFILTRATION; PERINEURAL PRN
Status: DISCONTINUED | OUTPATIENT
Start: 2019-12-21 | End: 2019-12-21 | Stop reason: ALTCHOICE

## 2019-12-21 RX ORDER — POLYETHYLENE GLYCOL 3350 17 G/17G
17 POWDER, FOR SOLUTION ORAL DAILY
Status: DISCONTINUED | OUTPATIENT
Start: 2019-12-21 | End: 2019-12-29 | Stop reason: HOSPADM

## 2019-12-21 RX ORDER — ONDANSETRON 2 MG/ML
4 INJECTION INTRAMUSCULAR; INTRAVENOUS EVERY 6 HOURS PRN
Status: DISCONTINUED | OUTPATIENT
Start: 2019-12-21 | End: 2019-12-29 | Stop reason: HOSPADM

## 2019-12-21 RX ORDER — MEPERIDINE HYDROCHLORIDE 25 MG/ML
12.5 INJECTION INTRAMUSCULAR; INTRAVENOUS; SUBCUTANEOUS EVERY 5 MIN PRN
Status: DISCONTINUED | OUTPATIENT
Start: 2019-12-21 | End: 2019-12-21 | Stop reason: HOSPADM

## 2019-12-21 RX ORDER — HYDRALAZINE HYDROCHLORIDE 20 MG/ML
5 INJECTION INTRAMUSCULAR; INTRAVENOUS EVERY 10 MIN PRN
Status: DISCONTINUED | OUTPATIENT
Start: 2019-12-21 | End: 2019-12-21 | Stop reason: HOSPADM

## 2019-12-21 RX ORDER — LIDOCAINE HCL/PF 100 MG/5ML
SYRINGE (ML) INJECTION PRN
Status: DISCONTINUED | OUTPATIENT
Start: 2019-12-21 | End: 2019-12-21 | Stop reason: SDUPTHER

## 2019-12-21 RX ORDER — DOCUSATE SODIUM 100 MG/1
100 CAPSULE, LIQUID FILLED ORAL 2 TIMES DAILY
Status: DISCONTINUED | OUTPATIENT
Start: 2019-12-21 | End: 2019-12-26 | Stop reason: SDUPTHER

## 2019-12-21 RX ORDER — SODIUM CHLORIDE 0.9 % (FLUSH) 0.9 %
10 SYRINGE (ML) INJECTION PRN
Status: DISCONTINUED | OUTPATIENT
Start: 2019-12-21 | End: 2019-12-26 | Stop reason: SDUPTHER

## 2019-12-21 RX ORDER — SCOLOPAMINE TRANSDERMAL SYSTEM 1 MG/1
1 PATCH, EXTENDED RELEASE TRANSDERMAL
Status: DISCONTINUED | OUTPATIENT
Start: 2019-12-21 | End: 2019-12-29 | Stop reason: HOSPADM

## 2019-12-21 RX ORDER — ROCURONIUM BROMIDE 10 MG/ML
INJECTION, SOLUTION INTRAVENOUS PRN
Status: DISCONTINUED | OUTPATIENT
Start: 2019-12-21 | End: 2019-12-21 | Stop reason: SDUPTHER

## 2019-12-21 RX ORDER — SODIUM CHLORIDE 0.9 % (FLUSH) 0.9 %
10 SYRINGE (ML) INJECTION EVERY 12 HOURS SCHEDULED
Status: DISCONTINUED | OUTPATIENT
Start: 2019-12-21 | End: 2019-12-26 | Stop reason: SDUPTHER

## 2019-12-21 RX ORDER — SODIUM CHLORIDE 9 MG/ML
INJECTION, SOLUTION INTRAVENOUS CONTINUOUS
Status: DISCONTINUED | OUTPATIENT
Start: 2019-12-21 | End: 2019-12-27

## 2019-12-21 RX ORDER — ACETAMINOPHEN 325 MG/1
650 TABLET ORAL EVERY 4 HOURS PRN
Status: DISCONTINUED | OUTPATIENT
Start: 2019-12-21 | End: 2019-12-29 | Stop reason: HOSPADM

## 2019-12-21 RX ORDER — ONDANSETRON 2 MG/ML
4 INJECTION INTRAMUSCULAR; INTRAVENOUS
Status: DISCONTINUED | OUTPATIENT
Start: 2019-12-21 | End: 2019-12-21 | Stop reason: HOSPADM

## 2019-12-21 RX ORDER — FENTANYL CITRATE 50 UG/ML
INJECTION, SOLUTION INTRAMUSCULAR; INTRAVENOUS PRN
Status: DISCONTINUED | OUTPATIENT
Start: 2019-12-21 | End: 2019-12-21 | Stop reason: SDUPTHER

## 2019-12-21 RX ORDER — CYCLOBENZAPRINE HCL 10 MG
10 TABLET ORAL 3 TIMES DAILY PRN
Status: DISCONTINUED | OUTPATIENT
Start: 2019-12-21 | End: 2019-12-29 | Stop reason: HOSPADM

## 2019-12-21 RX ORDER — DIPHENHYDRAMINE HYDROCHLORIDE 50 MG/ML
12.5 INJECTION INTRAMUSCULAR; INTRAVENOUS
Status: DISCONTINUED | OUTPATIENT
Start: 2019-12-21 | End: 2019-12-21 | Stop reason: HOSPADM

## 2019-12-21 RX ORDER — DEXAMETHASONE SODIUM PHOSPHATE 4 MG/ML
INJECTION, SOLUTION INTRA-ARTICULAR; INTRALESIONAL; INTRAMUSCULAR; INTRAVENOUS; SOFT TISSUE PRN
Status: DISCONTINUED | OUTPATIENT
Start: 2019-12-21 | End: 2019-12-21 | Stop reason: SDUPTHER

## 2019-12-21 RX ORDER — SODIUM CHLORIDE 9 MG/ML
INJECTION, SOLUTION INTRAVENOUS CONTINUOUS PRN
Status: DISCONTINUED | OUTPATIENT
Start: 2019-12-21 | End: 2019-12-21 | Stop reason: SDUPTHER

## 2019-12-21 RX ORDER — ONDANSETRON 2 MG/ML
INJECTION INTRAMUSCULAR; INTRAVENOUS PRN
Status: DISCONTINUED | OUTPATIENT
Start: 2019-12-21 | End: 2019-12-21 | Stop reason: SDUPTHER

## 2019-12-21 RX ADMIN — KETOROLAC TROMETHAMINE 30 MG: 30 INJECTION, SOLUTION INTRAMUSCULAR at 06:39

## 2019-12-21 RX ADMIN — SODIUM CHLORIDE: 9 INJECTION, SOLUTION INTRAVENOUS at 17:14

## 2019-12-21 RX ADMIN — ONDANSETRON HYDROCHLORIDE 4 MG: 4 INJECTION, SOLUTION INTRAMUSCULAR; INTRAVENOUS at 10:15

## 2019-12-21 RX ADMIN — CEFAZOLIN SODIUM 2 G: 10 INJECTION, POWDER, FOR SOLUTION INTRAVENOUS at 17:20

## 2019-12-21 RX ADMIN — CEFAZOLIN 2000 MG: 1 INJECTION, POWDER, FOR SOLUTION INTRAMUSCULAR; INTRAVENOUS; PARENTERAL at 10:08

## 2019-12-21 RX ADMIN — SODIUM CHLORIDE: 9 INJECTION, SOLUTION INTRAVENOUS at 11:05

## 2019-12-21 RX ADMIN — FENTANYL CITRATE 100 MCG: 50 INJECTION INTRAMUSCULAR; INTRAVENOUS at 10:39

## 2019-12-21 RX ADMIN — OXYCODONE HYDROCHLORIDE AND ACETAMINOPHEN 1 TABLET: 10; 325 TABLET ORAL at 14:34

## 2019-12-21 RX ADMIN — FENTANYL CITRATE 50 MCG: 50 INJECTION INTRAMUSCULAR; INTRAVENOUS at 11:01

## 2019-12-21 RX ADMIN — DEXAMETHASONE SODIUM PHOSPHATE 8 MG: 4 INJECTION, SOLUTION INTRAMUSCULAR; INTRAVENOUS at 10:15

## 2019-12-21 RX ADMIN — CYCLOBENZAPRINE 10 MG: 10 TABLET, FILM COATED ORAL at 14:34

## 2019-12-21 RX ADMIN — DOCUSATE SODIUM 100 MG: 100 CAPSULE, LIQUID FILLED ORAL at 22:10

## 2019-12-21 RX ADMIN — ONDANSETRON 4 MG: 2 INJECTION INTRAMUSCULAR; INTRAVENOUS at 12:39

## 2019-12-21 RX ADMIN — Medication 80 MG: at 09:51

## 2019-12-21 RX ADMIN — SUGAMMADEX 200 MG: 100 INJECTION, SOLUTION INTRAVENOUS at 10:39

## 2019-12-21 RX ADMIN — HYDROMORPHONE HYDROCHLORIDE 0.5 MG: 1 INJECTION, SOLUTION INTRAMUSCULAR; INTRAVENOUS; SUBCUTANEOUS at 22:10

## 2019-12-21 RX ADMIN — LABETALOL HYDROCHLORIDE 100 MG: 100 TABLET, FILM COATED ORAL at 17:14

## 2019-12-21 RX ADMIN — ROCURONIUM BROMIDE 40 MG: 10 INJECTION INTRAVENOUS at 09:51

## 2019-12-21 RX ADMIN — ONDANSETRON 4 MG: 2 INJECTION INTRAMUSCULAR; INTRAVENOUS at 22:09

## 2019-12-21 RX ADMIN — ONDANSETRON 4 MG: 2 INJECTION INTRAMUSCULAR; INTRAVENOUS at 01:10

## 2019-12-21 RX ADMIN — CYCLOBENZAPRINE 10 MG: 10 TABLET, FILM COATED ORAL at 22:10

## 2019-12-21 RX ADMIN — SODIUM CHLORIDE: 9 INJECTION, SOLUTION INTRAVENOUS at 09:45

## 2019-12-21 RX ADMIN — OXYCODONE HYDROCHLORIDE AND ACETAMINOPHEN 1 TABLET: 10; 325 TABLET ORAL at 01:51

## 2019-12-21 RX ADMIN — HYDROMORPHONE HYDROCHLORIDE 0.5 MG: 1 INJECTION, SOLUTION INTRAMUSCULAR; INTRAVENOUS; SUBCUTANEOUS at 12:39

## 2019-12-21 RX ADMIN — PROPOFOL 150 MG: 10 INJECTION, EMULSION INTRAVENOUS at 09:51

## 2019-12-21 RX ADMIN — PROMETHAZINE HYDROCHLORIDE 25 MG: 25 INJECTION INTRAMUSCULAR; INTRAVENOUS at 06:35

## 2019-12-21 RX ADMIN — KETOROLAC TROMETHAMINE 30 MG: 30 INJECTION, SOLUTION INTRAMUSCULAR at 00:17

## 2019-12-21 RX ADMIN — OXYCODONE HYDROCHLORIDE AND ACETAMINOPHEN 1 TABLET: 10; 325 TABLET ORAL at 18:48

## 2019-12-21 RX ADMIN — SODIUM CHLORIDE, PRESERVATIVE FREE 10 ML: 5 INJECTION INTRAVENOUS at 22:13

## 2019-12-21 RX ADMIN — FENTANYL CITRATE 100 MCG: 50 INJECTION INTRAMUSCULAR; INTRAVENOUS at 09:51

## 2019-12-21 ASSESSMENT — PULMONARY FUNCTION TESTS
PIF_VALUE: 14
PIF_VALUE: 15
PIF_VALUE: 15
PIF_VALUE: 2
PIF_VALUE: 14
PIF_VALUE: 14
PIF_VALUE: 2
PIF_VALUE: 13
PIF_VALUE: 0
PIF_VALUE: 3
PIF_VALUE: 2
PIF_VALUE: 2
PIF_VALUE: 4
PIF_VALUE: 15
PIF_VALUE: 2
PIF_VALUE: 2
PIF_VALUE: 15
PIF_VALUE: 15
PIF_VALUE: 2
PIF_VALUE: 10
PIF_VALUE: 15
PIF_VALUE: 14
PIF_VALUE: 0
PIF_VALUE: 18
PIF_VALUE: 14
PIF_VALUE: 14
PIF_VALUE: 15
PIF_VALUE: 15
PIF_VALUE: 14
PIF_VALUE: 15
PIF_VALUE: 14
PIF_VALUE: 2
PIF_VALUE: 15
PIF_VALUE: 15
PIF_VALUE: 14
PIF_VALUE: 14
PIF_VALUE: 2
PIF_VALUE: 9
PIF_VALUE: 15
PIF_VALUE: 2
PIF_VALUE: 15
PIF_VALUE: 2
PIF_VALUE: 15
PIF_VALUE: 14
PIF_VALUE: 2
PIF_VALUE: 26
PIF_VALUE: 15
PIF_VALUE: 26
PIF_VALUE: 2
PIF_VALUE: 13
PIF_VALUE: 14
PIF_VALUE: 10
PIF_VALUE: 2
PIF_VALUE: 15
PIF_VALUE: 15
PIF_VALUE: 2
PIF_VALUE: 6
PIF_VALUE: 2
PIF_VALUE: 14
PIF_VALUE: 15
PIF_VALUE: 14
PIF_VALUE: 3
PIF_VALUE: 14
PIF_VALUE: 2
PIF_VALUE: 2
PIF_VALUE: 15
PIF_VALUE: 2
PIF_VALUE: 15
PIF_VALUE: 13
PIF_VALUE: 15
PIF_VALUE: 14

## 2019-12-21 ASSESSMENT — PAIN DESCRIPTION - LOCATION
LOCATION: BACK
LOCATION: BACK
LOCATION: HEAD
LOCATION: BACK

## 2019-12-21 ASSESSMENT — PAIN SCALES - GENERAL
PAINLEVEL_OUTOF10: 6
PAINLEVEL_OUTOF10: 5
PAINLEVEL_OUTOF10: 7
PAINLEVEL_OUTOF10: 4
PAINLEVEL_OUTOF10: 3
PAINLEVEL_OUTOF10: 0
PAINLEVEL_OUTOF10: 7
PAINLEVEL_OUTOF10: 10
PAINLEVEL_OUTOF10: 2
PAINLEVEL_OUTOF10: 8
PAINLEVEL_OUTOF10: 10
PAINLEVEL_OUTOF10: 8
PAINLEVEL_OUTOF10: 2
PAINLEVEL_OUTOF10: 10

## 2019-12-21 ASSESSMENT — PAIN DESCRIPTION - DESCRIPTORS
DESCRIPTORS: SHARP;SHOOTING
DESCRIPTORS: BURNING

## 2019-12-21 ASSESSMENT — PAIN DESCRIPTION - FREQUENCY
FREQUENCY: CONTINUOUS
FREQUENCY: CONTINUOUS

## 2019-12-21 ASSESSMENT — PAIN DESCRIPTION - PAIN TYPE
TYPE: SURGICAL PAIN
TYPE: ACUTE PAIN

## 2019-12-21 ASSESSMENT — PAIN DESCRIPTION - PROGRESSION
CLINICAL_PROGRESSION: NOT CHANGED
CLINICAL_PROGRESSION: NOT CHANGED

## 2019-12-21 ASSESSMENT — PAIN - FUNCTIONAL ASSESSMENT: PAIN_FUNCTIONAL_ASSESSMENT: PREVENTS OR INTERFERES SOME ACTIVE ACTIVITIES AND ADLS

## 2019-12-21 ASSESSMENT — PAIN DESCRIPTION - ONSET: ONSET: ON-GOING

## 2019-12-21 ASSESSMENT — PAIN DESCRIPTION - ORIENTATION
ORIENTATION: RIGHT;LEFT
ORIENTATION: POSTERIOR

## 2019-12-21 NOTE — PLAN OF CARE
Problem: Falls - Risk of:  Goal: Will remain free from falls  Description  Will remain free from falls  Outcome: Ongoing  Note:   Bed alarm on. call light in reach, bed lowest position and wheels locked. Educated on use of call light when in need of assistance. Patient expressed understanding. Nonskid socks on. Hourly visual checks performed and charted. Toileting offered to patient. Arm band & falling star in place. Will continue to monitor. No falls during shift. Problem: Risk for Impaired Skin Integrity  Goal: Tissue integrity - skin and mucous membranes  Description  Structural intactness and normal physiological function of skin and  mucous membranes. Outcome: Ongoing  Note:   No new skin breakdown or tears noted. Mucus membranes pink and moist and intact. Will continue to assess and monitor skin. Encouraged and help repositioned pt every 2 hours and as needed. Problem: Pain:  Goal: Pain level will decrease  Description  Pain level will decrease  Outcome: Ongoing  Note:   Pt voiced headache 10/10 most of the shift. Pt has multiple pain medication which been requested and it has been administered as ordered. Pt voices very small relieve from pain meds. Pt is in supine position. Problem: Nausea/Vomiting:  Goal: Absence of nausea/vomiting  Description  Absence of nausea/vomiting  Outcome: Ongoing  Note:   Pt voiced nausea through out the shift and requested medication for it. Zofran IV and Phenergan IM administered as ordered. Problem: DISCHARGE BARRIERS  Goal: Patient's continuum of care needs are met  Outcome: Ongoing  Note:   Will continue to assess for after discharge needs. Pt. Plans to return home after discharge. Care plan reviewed with patient . Patient verbalize understanding of the plan of care and contribute to goal setting.

## 2019-12-21 NOTE — PLAN OF CARE
Problem: Falls - Risk of:  Goal: Will remain free from falls  Description  Will remain free from falls  12/21/2019 1027 by Wilfredo Dowling RN  Outcome: Ongoing  Note:   Pt absent of  falls this shift. RN visual checks on pt hourly with rounds. Call light in reach, bed in lowest position. Fall band intact. Bed alarm set. Pt educated to not get up per self to reduce the risk for falls. Problem: Falls - Risk of:  Goal: Absence of physical injury  Description  Absence of physical injury  Outcome: Ongoing  Note:   Pt absent of  falls this shift. RN visual checks on pt hourly with rounds. Call light in reach, bed in lowest position. Fall band intact. Bed alarm set. Pt educated to not get up per self to reduce the risk for falls. Problem: Risk for Impaired Skin Integrity  Goal: Tissue integrity - skin and mucous membranes  Description  Structural intactness and normal physiological function of skin and  mucous membranes. 12/21/2019 1027 by Wilfredo Dowling RN  Outcome: Ongoing  Note:   No new skin issues noted. Will monitor. Moves self frequently. Problem: Pain:  Goal: Pain level will decrease  Description  Pain level will decrease  12/21/2019 1027 by Wilfredo Dowling RN  Outcome: Ongoing  Note:   Pt has pain voiced this shift at 5-8/10. Pt pain goal is 0/10. RN continues to deliver PRN pain medications as ordered. RN tries to complete none invasive and none prescribed methods to help reduce pain like quiet, and dark setting. Pain re-assessed frequently. Bed alarm set after pain meds given. Fall band intact. Problem: Pain:  Goal: Control of acute pain  Description  Control of acute pain  Outcome: Ongoing  Note:   Pt has pain voiced this shift at 5-8/10. Pt pain goal is 0/10. RN continues to deliver PRN pain medications as ordered. RN tries to complete none invasive and none prescribed methods to help reduce pain like quiet, and dark setting. Pain re-assessed frequently.  Bed alarm set after pain meds given. Fall band intact. Problem: SAFETY  Goal: Free from accidental physical injury  Outcome: Ongoing  Note:   Pt absent of  falls this shift. RN visual checks on pt hourly with rounds. Call light in reach, bed in lowest position. Fall band intact. Bed alarm set. Pt educated to not get up per self to reduce the risk for falls. Problem: SAFETY  Goal: Free from intentional harm  Outcome: Ongoing  Note:   Pt absent of  falls this shift. RN visual checks on pt hourly with rounds. Call light in reach, bed in lowest position. Fall band intact. Bed alarm set. Pt educated to not get up per self to reduce the risk for falls. Problem: DAILY CARE  Goal: Daily care needs are met  Outcome: Ongoing  Note:   Daily care needs met. Problem: PAIN  Goal: Patient's pain/discomfort is manageable  Outcome: Ongoing  Note:   Pt has pain voiced this shift at 5-8/10. Pt pain goal is 0/10. RN continues to deliver PRN pain medications as ordered. RN tries to complete none invasive and none prescribed methods to help reduce pain like quiet, and dark setting. Pain re-assessed frequently. Bed alarm set after pain meds given. Fall band intact. Problem: Nausea/Vomiting:  Goal: Absence of nausea/vomiting  Description  Absence of nausea/vomiting  12/21/2019 1027 by Deb Hoffman RN  Outcome: Ongoing  Note:   Nausea continues, medications administered per orders. In surgery currently. NPO     Problem: Nausea/Vomiting:  Goal: Able to drink  Description  Able to drink  Outcome: Ongoing  Note:   Nausea continues, medications administered per orders. In surgery currently. NPO     Problem: Nausea/Vomiting:  Goal: Able to eat  Description  Able to eat  Outcome: Ongoing  Note:   Nausea continues, medications administered per orders. In surgery currently.  NPO     Problem: Nausea/Vomiting:  Goal: Ability to achieve adequate nutritional intake will improve  Description  Ability to

## 2019-12-21 NOTE — H&P
[address] [place] [address]                              HISTORY AND PHYSICAL    PATIENT NAME: Wendy RINCON                   :        1960  MED REC NO:   [id number]                           ROOM:       [id number]  ACCOUNT NO:   [de-identified]                           ADMIT DATE: 2019  PROVIDER:     Abimbola Wisdom PA-C    HOSPITAL ADMISSION HISTORY AND PHYSICAL    CHIEF COMPLAINT:  Severe headache, nausea and vomiting status post  lumbar fusion surgery from the date of 2019. HISTORY OF PRESENT ILLNESS:  The patient is a 61-year-old female  well-known to our practice having recently undergone an L4-L5 lumbar  decompressive laminectomy and fusion at [facility] on the date of 2019. We received a call yesterday afternoon by her home health nurse stating  that the patient is having complaints of very severe nausea and severe  headache. She had been dealing with these symptoms earlier in the week  as well and did present to the emergency department at [address] [place] [address]. However, at this time, this was a more traditional  migraine which she does suffer from chronically. With this migraine,  there was quite a bit of retching and vomiting as these symptoms  progressed. She now reports severe intolerance to being upright with  severe headache, worse in the posterior portion of the skull and severe  nausea and vomiting symptoms. These symptoms became severe enough that  we asked the patient to come to the hospital to be admitted for further  treatment and possible workup. She has been unable to progress with  ambulation and is quite miserable due to her severe headache and  consistent nausea that has been unrelieved despite the use of Phenergan  and Zofran. She is unable to get any relief with Percocet as well as  she is not able to keep this medication down.     ASSESSMENT:  Most likely postoperative durotomy secondary to vomiting  and retching. PLAN:  I will discuss further the patient's case. She does show  evidence of very clear drainage without even blood-tinged fluid, which  does make me feel that she is dealing with a cerebrospinal fluid leak. I will discuss with Dr. Jeromy Joyce possibly returning to the operating room  for exploration of her lumbar wound and repair of the durotomy, and I  will contact the nurse with further recommendations from there. We may  also consider giving IV Toradol for better pain relief. DRUG ALLERGIES:  ERYTHROMYCIN and PENICILLINS. PAST MEDICAL HISTORY:  Significant for migraine headaches, hypertension,  back pain, and neck pain. PAST SURGICAL HISTORY:  Includes history of three neck surgeries done  under our care, hysterectomy, lumbar spine surgery, and appendectomy. Lumbar spine surgery is the previously mentioned L4-L5 laminectomy and  fusion from 12/13/2019. REVIEW OF SYSTEMS:  Negative unless otherwise stated in the HPI. PHYSICAL EXAMINATION:  VITAL SIGNS:  Most recent vital signs show temperature 98 degrees,  respirations 16, pulse 89, blood pressure 134/78, and SpO2 at 100. GENERAL EXAM:  The patient is lying in bed. She is side lying with an  emesis basin next to her. She does appear to be in acute distress and  ill appearing. She complains of nausea and retching throughout the  examination and discussion. MUSCULOSKELETAL:  Examination of her lumbar wound shows drains in place  with a clear yellow-tinged fluid within the drains. There is no  evidence of any significant blood. Her dressing is intact. EXTREMITIES:  Lower extremity exam shows no neurologic deficit, loss of  sensation or loss of strength. CONCLUSION:  The patient is a patient who is now seven days out from  lumbar laminectomy and fusion.   I feel that she has developed  postoperative durotomy some time earlier this week after having an  episode of nausea and vomiting as she did not show any signs

## 2019-12-21 NOTE — PROGRESS NOTES
OR - on bowel meds; pt. feels she has lost weight - unable to verify with bedscale but appears thin  · Wound Type: Surgical Wound(L4-L5 lumbar laminectomy and fusion 12/13 IOS; 12/21 dura leak repair)  · Current Nutrition Therapies:  · Oral Diet Orders: Full Liquid   · Oral Diet intake: 1-25%  · Oral Nutrition Supplement (ONS) Orders: Low Volume Supplement, Other Oral Supplement (See Comment)(Compact TID; activia and hot beverage TID for nutrition ileus prevention)  · ONS intake: Unable to assess(starting today)  · Anthropometric Measures:  · Ht: 5' 5\" (165.1 cm)   · Current Body Wt: 127 lb (57.6 kg)(12/19 stated per pt.  - bedscale reads 147.5# - ? accuracy)  · Admission Body Wt: 127 lb (57.6 kg)(12/19 stated)  · Usual Body Wt: 127 lb (57.6 kg)(per pt.)  · % Weight Change:  ,  no EMR weights to verify  · Ideal Body Wt: 125 lb (56.7 kg),   · BMI Classification: BMI 18.5 - 24.9 Normal Weight(21.2)    Nutrition Interventions:   Continue current diet, Start ONS(diet advance per MD)  Continued Inpatient Monitoring, Education Initiated, Coordination of Care(discussed protein sources for healing process and use of ONS for here and home and nutrition ileus prevention)    Nutrition Evaluation:   · Evaluation: Goals set   · Goals: consume greater than 75% meals during LOS    · Monitoring: Nutrition Progression, Meal Intake, Supplement Intake, Diet Tolerance, Skin Integrity, Wound Healing, Weight, Pertinent Labs, Nausea or Vomiting, Constipation      Electronically signed by Padmini Carpio RD, LD on 12/21/19 at 2:50 PM    Contact Number: [phone]

## 2019-12-21 NOTE — BRIEF OP NOTE
Brief Postoperative Note  ______________________________________________________________    Patient: Rosalva Odom  YOB: 1960  MRN: [id number]  Date of Procedure: 12/21/2019    Pre-Op Diagnosis: possible dura leak    Post-Op Diagnosis: Same       Procedure(s):  LUMBAR EXPLORATION, POSS DURA LEAK REPAIR    Anesthesia: General    Surgeon(s):  Traci Coffey MD    Assistant: Stevo Edwards    Estimated Blood Loss (mL): less than 50     Complications: None    Specimens:   * No specimens in log *    Implants:  * No implants in log *      Drains:   Closed/Suction Drain Inferior;Midline Back Accordion 10 [address] (Active)       [REMOVED] Closed/Suction Drain Inferior Back (Removed)   Output (ml) 150 ml 12/20/2019  3:48 AM       [REMOVED] Closed/Suction Drain Back (Removed)   Output (ml) 0 ml 12/20/2019  3:48 AM       Findings: same    Traci Coffey MD  Date: 12/21/2019  Time: 10:38 AM

## 2019-12-22 LAB
BASOPHILS # BLD: 0.5 %
BASOPHILS ABSOLUTE: 0.1 THOU/MM3 (ref 0–0.1)
EOSINOPHIL # BLD: 2.2 %
EOSINOPHILS ABSOLUTE: 0.2 THOU/MM3 (ref 0–0.4)
ERYTHROCYTE [DISTWIDTH] IN BLOOD BY AUTOMATED COUNT: 13.1 % (ref 11.5–14.5)
ERYTHROCYTE [DISTWIDTH] IN BLOOD BY AUTOMATED COUNT: 42.8 FL (ref 35–45)
HCT VFR BLD CALC: 30.6 % (ref 37–47)
HEMOGLOBIN: 9.8 GM/DL (ref 12–16)
IMMATURE GRANS (ABS): 0.08 THOU/MM3 (ref 0–0.07)
IMMATURE GRANULOCYTES: 0.7 %
LYMPHOCYTES # BLD: 32.9 %
LYMPHOCYTES ABSOLUTE: 3.6 THOU/MM3 (ref 1–4.8)
MCH RBC QN AUTO: 29.4 PG (ref 26–33)
MCHC RBC AUTO-ENTMCNC: 32 GM/DL (ref 32.2–35.5)
MCV RBC AUTO: 91.9 FL (ref 81–99)
MONOCYTES # BLD: 6.4 %
MONOCYTES ABSOLUTE: 0.7 THOU/MM3 (ref 0.4–1.3)
NUCLEATED RED BLOOD CELLS: 0 /100 WBC
PLATELET # BLD: 376 THOU/MM3 (ref 130–400)
PMV BLD AUTO: 8.4 FL (ref 9.4–12.4)
RBC # BLD: 3.33 MILL/MM3 (ref 4.2–5.4)
SEG NEUTROPHILS: 57.3 %
SEGMENTED NEUTROPHILS ABSOLUTE COUNT: 6.3 THOU/MM3 (ref 1.8–7.7)
WBC # BLD: 11 THOU/MM3 (ref 4.8–10.8)

## 2019-12-22 PROCEDURE — 85025 COMPLETE CBC W/AUTO DIFF WBC: CPT

## 2019-12-22 PROCEDURE — 6360000002 HC RX W HCPCS: Performed by: PHYSICIAN ASSISTANT

## 2019-12-22 PROCEDURE — 6370000000 HC RX 637 (ALT 250 FOR IP): Performed by: PHYSICIAN ASSISTANT

## 2019-12-22 PROCEDURE — 94760 N-INVAS EAR/PLS OXIMETRY 1: CPT

## 2019-12-22 PROCEDURE — 2580000003 HC RX 258: Performed by: PHYSICIAN ASSISTANT

## 2019-12-22 PROCEDURE — 1200000000 HC SEMI PRIVATE

## 2019-12-22 PROCEDURE — 2580000003 HC RX 258: Performed by: INTERNAL MEDICINE

## 2019-12-22 PROCEDURE — 36415 COLL VENOUS BLD VENIPUNCTURE: CPT

## 2019-12-22 PROCEDURE — 99232 SBSQ HOSP IP/OBS MODERATE 35: CPT | Performed by: INTERNAL MEDICINE

## 2019-12-22 RX ORDER — LORAZEPAM 0.5 MG/1
0.5 TABLET ORAL 2 TIMES DAILY PRN
COMMUNITY

## 2019-12-22 RX ORDER — GABAPENTIN 300 MG/1
300 CAPSULE ORAL NIGHTLY
Status: DISCONTINUED | OUTPATIENT
Start: 2019-12-22 | End: 2019-12-29 | Stop reason: HOSPADM

## 2019-12-22 RX ORDER — 0.9 % SODIUM CHLORIDE 0.9 %
500 INTRAVENOUS SOLUTION INTRAVENOUS ONCE
Status: COMPLETED | OUTPATIENT
Start: 2019-12-22 | End: 2019-12-22

## 2019-12-22 RX ORDER — LORAZEPAM 0.5 MG/1
0.5 TABLET ORAL 2 TIMES DAILY PRN
Status: DISCONTINUED | OUTPATIENT
Start: 2019-12-22 | End: 2019-12-29 | Stop reason: HOSPADM

## 2019-12-22 RX ADMIN — GABAPENTIN 300 MG: 300 CAPSULE ORAL at 20:52

## 2019-12-22 RX ADMIN — CYCLOBENZAPRINE 10 MG: 10 TABLET, FILM COATED ORAL at 06:09

## 2019-12-22 RX ADMIN — LORAZEPAM 0.5 MG: 0.5 TABLET ORAL at 21:01

## 2019-12-22 RX ADMIN — POLYETHYLENE GLYCOL 3350 17 G: 17 POWDER, FOR SOLUTION ORAL at 08:33

## 2019-12-22 RX ADMIN — CEFAZOLIN SODIUM 2 G: 10 INJECTION, POWDER, FOR SOLUTION INTRAVENOUS at 02:00

## 2019-12-22 RX ADMIN — DOCUSATE SODIUM 100 MG: 100 CAPSULE, LIQUID FILLED ORAL at 08:33

## 2019-12-22 RX ADMIN — OXYCODONE HYDROCHLORIDE AND ACETAMINOPHEN 1 TABLET: 10; 325 TABLET ORAL at 00:40

## 2019-12-22 RX ADMIN — OXYCODONE HYDROCHLORIDE AND ACETAMINOPHEN 1 TABLET: 10; 325 TABLET ORAL at 16:14

## 2019-12-22 RX ADMIN — OXYCODONE HYDROCHLORIDE AND ACETAMINOPHEN 1 TABLET: 10; 325 TABLET ORAL at 10:40

## 2019-12-22 RX ADMIN — SERTRALINE 25 MG: 50 TABLET, FILM COATED ORAL at 08:33

## 2019-12-22 RX ADMIN — CYCLOBENZAPRINE 10 MG: 10 TABLET, FILM COATED ORAL at 20:51

## 2019-12-22 RX ADMIN — DOCUSATE SODIUM 100 MG: 100 CAPSULE, LIQUID FILLED ORAL at 20:52

## 2019-12-22 RX ADMIN — OXYCODONE HYDROCHLORIDE AND ACETAMINOPHEN 1 TABLET: 10; 325 TABLET ORAL at 20:51

## 2019-12-22 RX ADMIN — SODIUM CHLORIDE 500 ML: 9 INJECTION, SOLUTION INTRAVENOUS at 13:46

## 2019-12-22 RX ADMIN — SODIUM CHLORIDE, PRESERVATIVE FREE 10 ML: 5 INJECTION INTRAVENOUS at 20:52

## 2019-12-22 RX ADMIN — OXYCODONE HYDROCHLORIDE AND ACETAMINOPHEN 1 TABLET: 10; 325 TABLET ORAL at 06:10

## 2019-12-22 RX ADMIN — LORAZEPAM 0.5 MG: 0.5 TABLET ORAL at 10:40

## 2019-12-22 ASSESSMENT — PAIN DESCRIPTION - LOCATION: LOCATION: BACK

## 2019-12-22 ASSESSMENT — PAIN SCALES - GENERAL
PAINLEVEL_OUTOF10: 7
PAINLEVEL_OUTOF10: 10
PAINLEVEL_OUTOF10: 8
PAINLEVEL_OUTOF10: 7
PAINLEVEL_OUTOF10: 7
PAINLEVEL_OUTOF10: 8
PAINLEVEL_OUTOF10: 5
PAINLEVEL_OUTOF10: 0
PAINLEVEL_OUTOF10: 6

## 2019-12-22 ASSESSMENT — PAIN DESCRIPTION - ONSET: ONSET: ON-GOING

## 2019-12-22 ASSESSMENT — PAIN - FUNCTIONAL ASSESSMENT: PAIN_FUNCTIONAL_ASSESSMENT: ACTIVITIES ARE NOT PREVENTED

## 2019-12-22 ASSESSMENT — PAIN DESCRIPTION - ORIENTATION: ORIENTATION: POSTERIOR

## 2019-12-22 ASSESSMENT — PAIN DESCRIPTION - PROGRESSION: CLINICAL_PROGRESSION: NOT CHANGED

## 2019-12-22 ASSESSMENT — PAIN DESCRIPTION - PAIN TYPE: TYPE: SURGICAL PAIN

## 2019-12-22 ASSESSMENT — PAIN DESCRIPTION - FREQUENCY: FREQUENCY: CONTINUOUS

## 2019-12-22 ASSESSMENT — PAIN DESCRIPTION - DESCRIPTORS: DESCRIPTORS: BURNING;ACHING

## 2019-12-22 NOTE — FLOWSHEET NOTE
12/22/19 1632   Encounter Summary   Services provided to: Patient   Referral/Consult From: [facility]   Continue Visiting Yes  (12/22)   Complexity of Encounter Low   Length of Encounter 15 minutes   Routine   Type Initial   Assessment Calm; Approachable; Hopeful   Intervention Active listening;Amelia   Outcome Expressed gratitude;Encouraged

## 2019-12-22 NOTE — PROGRESS NOTES
[address] [place] [id number]  PHYSICAL THERAPY MISSED TREATMENT NOTE  ACUTE CARE  [facility]              Missed Treatment    Per orders and nurse patient still on bed rest--flat and therefore missed treatment this date. PT plan to check back 12/23.     Julian Rosario, [address] [place] [address]

## 2019-12-22 NOTE — PROGRESS NOTES
Department of Orthopedic Surgery  Spine Service  Jayjay Gray PA-C Progress Note        Subjective:  Afshin España is resting in bed, she feels better than prior to surgery, but is still very sore. She does not have a HA. Nausea is controlled. Drain output is low and output is dark in quality. Plan to keep down today.      Vitals  VITALS:  BP (!) 88/51   Pulse 91   Temp 98.8 °F (37.1 °C) (Oral)   Resp 18   Ht 5' 5\" (1.651 m)   Wt 127 lb (57.6 kg)   SpO2 96%   BMI 21.13 kg/m²   24HR INTAKE/OUTPUT:      Intake/Output Summary (Last 24 hours) at 12/22/2019 0959  Last data filed at 12/22/2019 9534  Gross per 24 hour   Intake 4018.21 ml   Output 812.5 ml   Net 3205.71 ml     URINARY CATHETER OUTPUT (Guerrero):  Urethral Catheter 16 fr-Output (mL): 300 mL  DRAIN/TUBE OUTPUT:  [REMOVED] Closed/Suction Drain Inferior Back-Output (ml): 150 ml  [REMOVED] Closed/Suction Drain Back-Output (ml): 0 ml  Closed/Suction Drain Inferior;Midline Back Accordion 10 [place]-Output (ml): 30 ml      PHYSICAL EXAM:    Orientation:  alert and oriented to person, place and time    Incision:  dressing in place, clean, dry, intact    Lower Extremity Motor :  quadriceps, extensor hallucis longus, dorsiflexion, plantarflexion 5/5 bilaterally  Lower Extremity Sensory:  Intact L1-S1    Flatus:  negative    ABNORMAL EXAM FINDINGS:  none    LABS:    HgB:    Lab Results   Component Value Date    HGB 9.8 12/22/2019         ASSESSMENT AND PLAN:    Post operative day 1     1:  Monitor labs and drain output  2:  Activity Level:  Flat bedrest, may be up slightly for meals no greater than 15 degrees  3:  Pain Control:  Continue meds  4:  Discharge Planning:  Awaiting activity advance  5:  Restart ativan and gabapentin

## 2019-12-23 LAB
BASOPHILS # BLD: 0.5 %
BASOPHILS ABSOLUTE: 0 THOU/MM3 (ref 0–0.1)
EOSINOPHIL # BLD: 4 %
EOSINOPHILS ABSOLUTE: 0.4 THOU/MM3 (ref 0–0.4)
ERYTHROCYTE [DISTWIDTH] IN BLOOD BY AUTOMATED COUNT: 13.2 % (ref 11.5–14.5)
ERYTHROCYTE [DISTWIDTH] IN BLOOD BY AUTOMATED COUNT: 44.1 FL (ref 35–45)
HCT VFR BLD CALC: 32.5 % (ref 37–47)
HEMOGLOBIN: 10.4 GM/DL (ref 12–16)
IMMATURE GRANS (ABS): 0.06 THOU/MM3 (ref 0–0.07)
IMMATURE GRANULOCYTES: 0.7 %
LYMPHOCYTES # BLD: 30.6 %
LYMPHOCYTES ABSOLUTE: 2.7 THOU/MM3 (ref 1–4.8)
MCH RBC QN AUTO: 29.7 PG (ref 26–33)
MCHC RBC AUTO-ENTMCNC: 32 GM/DL (ref 32.2–35.5)
MCV RBC AUTO: 92.9 FL (ref 81–99)
MONOCYTES # BLD: 6.3 %
MONOCYTES ABSOLUTE: 0.6 THOU/MM3 (ref 0.4–1.3)
NUCLEATED RED BLOOD CELLS: 0 /100 WBC
PLATELET # BLD: 403 THOU/MM3 (ref 130–400)
PMV BLD AUTO: 8.4 FL (ref 9.4–12.4)
RBC # BLD: 3.5 MILL/MM3 (ref 4.2–5.4)
SEG NEUTROPHILS: 57.9 %
SEGMENTED NEUTROPHILS ABSOLUTE COUNT: 5.2 THOU/MM3 (ref 1.8–7.7)
WBC # BLD: 8.9 THOU/MM3 (ref 4.8–10.8)

## 2019-12-23 PROCEDURE — 2580000003 HC RX 258: Performed by: PHYSICIAN ASSISTANT

## 2019-12-23 PROCEDURE — 51798 US URINE CAPACITY MEASURE: CPT

## 2019-12-23 PROCEDURE — 2580000003 HC RX 258: Performed by: INTERNAL MEDICINE

## 2019-12-23 PROCEDURE — 6370000000 HC RX 637 (ALT 250 FOR IP): Performed by: PHYSICIAN ASSISTANT

## 2019-12-23 PROCEDURE — 1200000000 HC SEMI PRIVATE

## 2019-12-23 PROCEDURE — 94760 N-INVAS EAR/PLS OXIMETRY 1: CPT

## 2019-12-23 PROCEDURE — 36415 COLL VENOUS BLD VENIPUNCTURE: CPT

## 2019-12-23 PROCEDURE — 85025 COMPLETE CBC W/AUTO DIFF WBC: CPT

## 2019-12-23 PROCEDURE — 6360000002 HC RX W HCPCS: Performed by: PHYSICIAN ASSISTANT

## 2019-12-23 PROCEDURE — 6370000000 HC RX 637 (ALT 250 FOR IP): Performed by: INTERNAL MEDICINE

## 2019-12-23 RX ORDER — BISACODYL 10 MG
10 SUPPOSITORY, RECTAL RECTAL DAILY PRN
Status: DISCONTINUED | OUTPATIENT
Start: 2019-12-23 | End: 2019-12-29 | Stop reason: HOSPADM

## 2019-12-23 RX ORDER — 0.9 % SODIUM CHLORIDE 0.9 %
250 INTRAVENOUS SOLUTION INTRAVENOUS ONCE
Status: COMPLETED | OUTPATIENT
Start: 2019-12-23 | End: 2019-12-23

## 2019-12-23 RX ORDER — GABAPENTIN 300 MG/1
300 CAPSULE ORAL DAILY PRN
COMMUNITY

## 2019-12-23 RX ADMIN — SODIUM CHLORIDE 250 ML: 9 INJECTION, SOLUTION INTRAVENOUS at 20:05

## 2019-12-23 RX ADMIN — DOCUSATE SODIUM 100 MG: 100 CAPSULE, LIQUID FILLED ORAL at 20:05

## 2019-12-23 RX ADMIN — OXYCODONE HYDROCHLORIDE AND ACETAMINOPHEN 1 TABLET: 10; 325 TABLET ORAL at 05:35

## 2019-12-23 RX ADMIN — LABETALOL HYDROCHLORIDE 100 MG: 100 TABLET, FILM COATED ORAL at 10:39

## 2019-12-23 RX ADMIN — SODIUM CHLORIDE, PRESERVATIVE FREE 10 ML: 5 INJECTION INTRAVENOUS at 20:06

## 2019-12-23 RX ADMIN — CYCLOBENZAPRINE 10 MG: 10 TABLET, FILM COATED ORAL at 15:14

## 2019-12-23 RX ADMIN — LORAZEPAM 0.5 MG: 0.5 TABLET ORAL at 19:27

## 2019-12-23 RX ADMIN — SODIUM CHLORIDE, PRESERVATIVE FREE 10 ML: 5 INJECTION INTRAVENOUS at 10:39

## 2019-12-23 RX ADMIN — DOCUSATE SODIUM 100 MG: 100 CAPSULE, LIQUID FILLED ORAL at 10:39

## 2019-12-23 RX ADMIN — OXYCODONE HYDROCHLORIDE AND ACETAMINOPHEN 1 TABLET: 10; 325 TABLET ORAL at 19:27

## 2019-12-23 RX ADMIN — OXYCODONE HYDROCHLORIDE AND ACETAMINOPHEN 1 TABLET: 10; 325 TABLET ORAL at 15:14

## 2019-12-23 RX ADMIN — PROMETHAZINE HYDROCHLORIDE 25 MG: 25 INJECTION INTRAMUSCULAR; INTRAVENOUS at 05:35

## 2019-12-23 RX ADMIN — POLYETHYLENE GLYCOL 3350 17 G: 17 POWDER, FOR SOLUTION ORAL at 10:39

## 2019-12-23 RX ADMIN — SERTRALINE 25 MG: 50 TABLET, FILM COATED ORAL at 10:39

## 2019-12-23 RX ADMIN — OXYCODONE HYDROCHLORIDE AND ACETAMINOPHEN 1 TABLET: 10; 325 TABLET ORAL at 01:21

## 2019-12-23 RX ADMIN — OXYCODONE HYDROCHLORIDE AND ACETAMINOPHEN 1 TABLET: 10; 325 TABLET ORAL at 10:39

## 2019-12-23 RX ADMIN — GABAPENTIN 300 MG: 300 CAPSULE ORAL at 20:05

## 2019-12-23 ASSESSMENT — PAIN SCALES - GENERAL
PAINLEVEL_OUTOF10: 7
PAINLEVEL_OUTOF10: 8
PAINLEVEL_OUTOF10: 8
PAINLEVEL_OUTOF10: 7
PAINLEVEL_OUTOF10: 8
PAINLEVEL_OUTOF10: 8
PAINLEVEL_OUTOF10: 0
PAINLEVEL_OUTOF10: 0
PAINLEVEL_OUTOF10: 8
PAINLEVEL_OUTOF10: 5
PAINLEVEL_OUTOF10: 7

## 2019-12-23 ASSESSMENT — PAIN DESCRIPTION - LOCATION
LOCATION: BACK
LOCATION: BACK

## 2019-12-23 ASSESSMENT — PAIN DESCRIPTION - DESCRIPTORS
DESCRIPTORS: BURNING;STABBING
DESCRIPTORS: SHARP

## 2019-12-23 ASSESSMENT — PAIN DESCRIPTION - PAIN TYPE
TYPE: SURGICAL PAIN
TYPE: SURGICAL PAIN

## 2019-12-23 ASSESSMENT — PAIN DESCRIPTION - FREQUENCY
FREQUENCY: CONTINUOUS
FREQUENCY: CONTINUOUS

## 2019-12-23 ASSESSMENT — PAIN - FUNCTIONAL ASSESSMENT
PAIN_FUNCTIONAL_ASSESSMENT: PREVENTS OR INTERFERES SOME ACTIVE ACTIVITIES AND ADLS
PAIN_FUNCTIONAL_ASSESSMENT: PREVENTS OR INTERFERES SOME ACTIVE ACTIVITIES AND ADLS

## 2019-12-23 ASSESSMENT — PAIN DESCRIPTION - PROGRESSION
CLINICAL_PROGRESSION: GRADUALLY WORSENING
CLINICAL_PROGRESSION: NOT CHANGED

## 2019-12-23 ASSESSMENT — PAIN DESCRIPTION - ORIENTATION
ORIENTATION: MID;LOWER
ORIENTATION: LOWER

## 2019-12-23 ASSESSMENT — PAIN DESCRIPTION - ONSET
ONSET: ON-GOING
ONSET: ON-GOING

## 2019-12-23 NOTE — PROGRESS NOTES
Department of Orthopedic Surgery  Spine Service  Antonieta Garcia PA-C Progress Note        Subjective:  Jessica Cardozo is resting in bed, she had a mild HA this AM. Her drain outputs have increased, thin, clear, red fluid. May slowly advance, allowing to sit up for meals.       Vitals  VITALS:  /69   Pulse 87   Temp 98.7 °F (37.1 °C) (Oral)   Resp 16   Ht 5' 5\" (1.651 m)   Wt 127 lb (57.6 kg)   SpO2 95%   BMI 21.13 kg/m²   24HR INTAKE/OUTPUT:      Intake/Output Summary (Last 24 hours) at 12/23/2019 0636  Last data filed at 12/23/2019 0540  Gross per 24 hour   Intake 930 ml   Output 2900 ml   Net -1970 ml     URINARY CATHETER OUTPUT (Guerrero):  Urethral Catheter 16 fr-Output (mL): 1500 mL  DRAIN/TUBE OUTPUT:  [REMOVED] Closed/Suction Drain Inferior Back-Output (ml): 150 ml  [REMOVED] Closed/Suction Drain Back-Output (ml): 0 ml  Closed/Suction Drain Inferior;Midline Back Accordion 10 [place]-Output (ml): 160 ml      PHYSICAL EXAM:    Orientation:  alert and oriented to person, place and time    Incision:  dressing in place, clean, dry, intact    Lower Extremity Motor :  quadriceps, extensor hallucis longus, dorsiflexion, plantarflexion 5/5 bilaterally  Lower Extremity Sensory:  Intact L1-S1    Flatus:  positive    ABNORMAL EXAM FINDINGS:  none    LABS:    HgB:    Lab Results   Component Value Date    HGB 9.8 12/22/2019         ASSESSMENT AND PLAN:    Post operative day 2     1:  Monitor labs and drain output  2:  Activity Level: Up to 30 degrees for meals, return flat if HA  3:  Pain Control:  Controlled  4:  Discharge Planning:  Awaiting advance of activity

## 2019-12-24 LAB
BASOPHILS # BLD: 0.6 %
BASOPHILS ABSOLUTE: 0.1 THOU/MM3 (ref 0–0.1)
EOSINOPHIL # BLD: 4.4 %
EOSINOPHILS ABSOLUTE: 0.4 THOU/MM3 (ref 0–0.4)
ERYTHROCYTE [DISTWIDTH] IN BLOOD BY AUTOMATED COUNT: 13.3 % (ref 11.5–14.5)
ERYTHROCYTE [DISTWIDTH] IN BLOOD BY AUTOMATED COUNT: 45.7 FL (ref 35–45)
HCT VFR BLD CALC: 31.7 % (ref 37–47)
HEMOGLOBIN: 10.2 GM/DL (ref 12–16)
IMMATURE GRANS (ABS): 0.08 THOU/MM3 (ref 0–0.07)
IMMATURE GRANULOCYTES: 0.9 %
LYMPHOCYTES # BLD: 33.4 %
LYMPHOCYTES ABSOLUTE: 2.9 THOU/MM3 (ref 1–4.8)
MCH RBC QN AUTO: 30.2 PG (ref 26–33)
MCHC RBC AUTO-ENTMCNC: 32.2 GM/DL (ref 32.2–35.5)
MCV RBC AUTO: 93.8 FL (ref 81–99)
MONOCYTES # BLD: 6.8 %
MONOCYTES ABSOLUTE: 0.6 THOU/MM3 (ref 0.4–1.3)
NUCLEATED RED BLOOD CELLS: 0 /100 WBC
PLATELET # BLD: 398 THOU/MM3 (ref 130–400)
PMV BLD AUTO: 8.5 FL (ref 9.4–12.4)
RBC # BLD: 3.38 MILL/MM3 (ref 4.2–5.4)
SEG NEUTROPHILS: 53.9 %
SEGMENTED NEUTROPHILS ABSOLUTE COUNT: 4.7 THOU/MM3 (ref 1.8–7.7)
WBC # BLD: 8.7 THOU/MM3 (ref 4.8–10.8)

## 2019-12-24 PROCEDURE — 36415 COLL VENOUS BLD VENIPUNCTURE: CPT

## 2019-12-24 PROCEDURE — 6360000002 HC RX W HCPCS

## 2019-12-24 PROCEDURE — 2580000003 HC RX 258: Performed by: PHYSICIAN ASSISTANT

## 2019-12-24 PROCEDURE — 1200000000 HC SEMI PRIVATE

## 2019-12-24 PROCEDURE — 6370000000 HC RX 637 (ALT 250 FOR IP): Performed by: INTERNAL MEDICINE

## 2019-12-24 PROCEDURE — 51798 US URINE CAPACITY MEASURE: CPT

## 2019-12-24 PROCEDURE — 6370000000 HC RX 637 (ALT 250 FOR IP): Performed by: PHYSICIAN ASSISTANT

## 2019-12-24 PROCEDURE — 85025 COMPLETE CBC W/AUTO DIFF WBC: CPT

## 2019-12-24 PROCEDURE — 6360000002 HC RX W HCPCS: Performed by: PHYSICIAN ASSISTANT

## 2019-12-24 RX ORDER — KETOROLAC TROMETHAMINE 30 MG/ML
30 INJECTION, SOLUTION INTRAMUSCULAR; INTRAVENOUS ONCE
Status: COMPLETED | OUTPATIENT
Start: 2019-12-24 | End: 2019-12-24

## 2019-12-24 RX ORDER — SODIUM CHLORIDE 9 MG/ML
INJECTION, SOLUTION INTRAVENOUS CONTINUOUS
Status: DISCONTINUED | OUTPATIENT
Start: 2019-12-24 | End: 2019-12-27

## 2019-12-24 RX ORDER — BUTALBITAL, ACETAMINOPHEN AND CAFFEINE 50; 325; 40 MG/1; MG/1; MG/1
1 TABLET ORAL EVERY 4 HOURS PRN
Status: DISCONTINUED | OUTPATIENT
Start: 2019-12-24 | End: 2019-12-29 | Stop reason: HOSPADM

## 2019-12-24 RX ORDER — KETOROLAC TROMETHAMINE 30 MG/ML
INJECTION, SOLUTION INTRAMUSCULAR; INTRAVENOUS
Status: COMPLETED
Start: 2019-12-24 | End: 2019-12-24

## 2019-12-24 RX ADMIN — ONDANSETRON 4 MG: 2 INJECTION INTRAMUSCULAR; INTRAVENOUS at 05:01

## 2019-12-24 RX ADMIN — LABETALOL HYDROCHLORIDE 100 MG: 100 TABLET, FILM COATED ORAL at 10:06

## 2019-12-24 RX ADMIN — OXYCODONE HYDROCHLORIDE AND ACETAMINOPHEN 1 TABLET: 10; 325 TABLET ORAL at 00:24

## 2019-12-24 RX ADMIN — ONDANSETRON 4 MG: 2 INJECTION INTRAMUSCULAR; INTRAVENOUS at 00:24

## 2019-12-24 RX ADMIN — GABAPENTIN 300 MG: 300 CAPSULE ORAL at 20:28

## 2019-12-24 RX ADMIN — KETOROLAC TROMETHAMINE 30 MG: 30 INJECTION, SOLUTION INTRAMUSCULAR at 06:24

## 2019-12-24 RX ADMIN — KETOROLAC TROMETHAMINE 30 MG: 30 INJECTION, SOLUTION INTRAMUSCULAR; INTRAVENOUS at 06:24

## 2019-12-24 RX ADMIN — OXYCODONE HYDROCHLORIDE AND ACETAMINOPHEN 1 TABLET: 10; 325 TABLET ORAL at 10:06

## 2019-12-24 RX ADMIN — BUTALBITAL, ACETAMINOPHEN, AND CAFFEINE 1 TABLET: 50; 325; 40 TABLET ORAL at 12:58

## 2019-12-24 RX ADMIN — BISACODYL 10 MG: 10 SUPPOSITORY RECTAL at 10:06

## 2019-12-24 RX ADMIN — OXYCODONE HYDROCHLORIDE AND ACETAMINOPHEN 1 TABLET: 10; 325 TABLET ORAL at 20:26

## 2019-12-24 RX ADMIN — PROMETHAZINE HYDROCHLORIDE 25 MG: 25 INJECTION INTRAMUSCULAR; INTRAVENOUS at 06:08

## 2019-12-24 RX ADMIN — DOCUSATE SODIUM 100 MG: 100 CAPSULE, LIQUID FILLED ORAL at 10:06

## 2019-12-24 RX ADMIN — OXYCODONE HYDROCHLORIDE AND ACETAMINOPHEN 1 TABLET: 10; 325 TABLET ORAL at 05:01

## 2019-12-24 RX ADMIN — BUTALBITAL, ACETAMINOPHEN, AND CAFFEINE 1 TABLET: 50; 325; 40 TABLET ORAL at 17:48

## 2019-12-24 RX ADMIN — SODIUM CHLORIDE: 9 INJECTION, SOLUTION INTRAVENOUS at 20:55

## 2019-12-24 RX ADMIN — LORAZEPAM 0.5 MG: 0.5 TABLET ORAL at 20:26

## 2019-12-24 RX ADMIN — CYCLOBENZAPRINE 10 MG: 10 TABLET, FILM COATED ORAL at 12:54

## 2019-12-24 RX ADMIN — POLYETHYLENE GLYCOL 3350 17 G: 17 POWDER, FOR SOLUTION ORAL at 10:06

## 2019-12-24 RX ADMIN — SODIUM CHLORIDE, PRESERVATIVE FREE 10 ML: 5 INJECTION INTRAVENOUS at 20:27

## 2019-12-24 RX ADMIN — SERTRALINE 25 MG: 50 TABLET, FILM COATED ORAL at 10:06

## 2019-12-24 RX ADMIN — DOCUSATE SODIUM 100 MG: 100 CAPSULE, LIQUID FILLED ORAL at 20:26

## 2019-12-24 ASSESSMENT — PAIN SCALES - GENERAL
PAINLEVEL_OUTOF10: 10
PAINLEVEL_OUTOF10: 10
PAINLEVEL_OUTOF10: 8
PAINLEVEL_OUTOF10: 9
PAINLEVEL_OUTOF10: 2
PAINLEVEL_OUTOF10: 3
PAINLEVEL_OUTOF10: 8
PAINLEVEL_OUTOF10: 10
PAINLEVEL_OUTOF10: 4
PAINLEVEL_OUTOF10: 6
PAINLEVEL_OUTOF10: 10
PAINLEVEL_OUTOF10: 5
PAINLEVEL_OUTOF10: 4
PAINLEVEL_OUTOF10: 9

## 2019-12-24 ASSESSMENT — PAIN DESCRIPTION - FREQUENCY
FREQUENCY: INTERMITTENT
FREQUENCY: INTERMITTENT

## 2019-12-24 ASSESSMENT — PAIN DESCRIPTION - ONSET
ONSET: ON-GOING
ONSET: AWAKENED FROM SLEEP

## 2019-12-24 ASSESSMENT — PAIN DESCRIPTION - PROGRESSION
CLINICAL_PROGRESSION: GRADUALLY IMPROVING
CLINICAL_PROGRESSION: GRADUALLY IMPROVING

## 2019-12-24 ASSESSMENT — PAIN DESCRIPTION - LOCATION
LOCATION: HEAD
LOCATION: HEAD

## 2019-12-24 ASSESSMENT — PAIN DESCRIPTION - DESCRIPTORS
DESCRIPTORS: HEADACHE
DESCRIPTORS: SHARP;RADIATING

## 2019-12-24 ASSESSMENT — PAIN DESCRIPTION - PAIN TYPE
TYPE: ACUTE PAIN
TYPE: ACUTE PAIN

## 2019-12-24 ASSESSMENT — PAIN DESCRIPTION - ORIENTATION: ORIENTATION: ANTERIOR

## 2019-12-24 NOTE — PLAN OF CARE
Problem: Falls - Risk of:  Goal: Will remain free from falls  Description  Will remain free from falls  12/23/2019 2351 by Lance Baltazar RN  Outcome: Ongoing  Note:   Patient remained free of falls this shift. Fall precautions in place. Items within reach, call light within reach and side rails up x2. Bed alarm is on. Hourly rounding in place. Patient is currently on bedrest due to dura repair. Will monitor. Problem: Pain:  Goal: Pain level will decrease  Description  Pain level will decrease  12/23/2019 2351 by Lance Baltazar RN  Outcome: Ongoing  Note:   Patient last rated pain at a 6/10. Oral pain medications to help reduce pain. Rest and repositioning in place as nonpharmacologic pain relief methods. Both working towards patient's pain goal of 2/10. Will monitor. Problem: SAFETY  Goal: Free from accidental physical injury  12/23/2019 2351 by Lance Baltazar RN  Outcome: Ongoing  Note:   No injury has occurred this shift. Hourly rounding in place. Problem: Risk for Impaired Skin Integrity  Goal: Tissue integrity - skin and mucous membranes  Description  Structural intactness and normal physiological function of skin and  mucous membranes. 12/23/2019 2351 by Lance Baltazar RN  Outcome: Ongoing  Note:   Full skin assessment completed. Patient is able to turn and reposition self. Will monitor skin integrity. Problem: PAIN  Goal: Patient's pain/discomfort is manageable  12/23/2019 2351 by Lance Baltazar RN  Outcome: Ongoing  Note:   Pain has decrease after medication administration. Periods of rest noted. Will monitor,. Problem: Nausea/Vomiting:  Goal: Absence of nausea/vomiting  Description  Absence of nausea/vomiting  12/23/2019 2351 by Lance Baltazar RN  Outcome: Ongoing  Note:   No nausea or vomiting noted so far this shift. Will monitor.       Problem: DISCHARGE BARRIERS  Goal: Patient's continuum of care needs are met  12/23/2019 2351 by Lance Baltazar RN  Outcome:

## 2019-12-24 NOTE — PLAN OF CARE
Problem: Falls - Risk of:  Goal: Will remain free from falls  Description  Will remain free from falls  12/23/2019 2005 by Penny Ordoñez RN  Outcome: Ongoing  Note:   Pt using call light appropriately to call for assistance . Pt reports understanding of fall prevention when discussed. Problem: Falls - Risk of:  Goal: Absence of physical injury  Description  Absence of physical injury  12/23/2019 2005 by Penny Ordoñez RN  Outcome: Ongoing  Note:   Pt using call light appropriately to call for assistance . Pt reports understanding of fall prevention when discussed. Problem: Risk for Impaired Skin Integrity  Goal: Tissue integrity - skin and mucous membranes  Description  Structural intactness and normal physiological function of skin and  mucous membranes. 12/23/2019 2005 by Penny Ordoñez RN  Outcome: Ongoing  Note:   Pt's surgical incision healing. Dressing is dry and intact and not due to be changed today. No other skin impairments noted. Pt understands the importance of frequent repositioning in order to prevent any skin breakdown. Problem: Pain:  Goal: Pain level will decrease  Description  Pain level will decrease  12/23/2019 2005 by Penny Ordoñez RN  Outcome: Ongoing  Note:   Pt report pain at 5-8 on scale. Pt states oral medication helping to achieve pain goal of a 5 on scale. Problem: Pain:  Goal: Control of acute pain  Description  Control of acute pain  12/23/2019 2005 by Penny Ordoñez RN  Outcome: Ongoing  Note:   Pt report pain at 5-8 on scale. Pt states oral medication helping to achieve pain goal of a 5 on scale. Problem: SAFETY  Goal: Free from accidental physical injury  12/23/2019 2005 by Penny Ordoñez RN  Outcome: Ongoing  Note:   Pt using call light appropriately to call for assistance . Pt reports understanding of fall prevention when discussed.       Problem: SAFETY  Goal: Free from intentional harm  12/23/2019 2005 by Penny Ordoñez RN  Outcome: Ongoing  Note:   Pt using call light appropriately to call for assistance . Pt reports understanding of fall prevention when discussed. Problem: DAILY CARE  Goal: Daily care needs are met  12/23/2019 2005 by Jeremiah Turk RN  Outcome: Ongoing  Note:   Independent once set up for meals and bathing. Problem: PAIN  Goal: Patient's pain/discomfort is manageable  12/23/2019 2005 by Jeremiah Turk RN  Outcome: Ongoing  Note:   Pt report pain at 5-8 on scale. Pt states oral medication helping to achieve pain goal of a 5 on scale. Problem: Nausea/Vomiting:  Goal: Absence of nausea/vomiting  Description  Absence of nausea/vomiting  12/23/2019 2005 by Jeremiah Turk RN  Outcome: Ongoing  Note:   No nausea or vomitting this shift. Problem: Nausea/Vomiting:  Goal: Able to drink  Description  Able to drink  12/23/2019 2005 by Jeremiah Turk RN  Outcome: Ongoing  Note:   Eating independently at meals and able to eat adequate amounts. Problem: Nausea/Vomiting:  Goal: Able to eat  Description  Able to eat  12/23/2019 2005 by Jeremiah Turk RN  Outcome: Ongoing  Note:   Eating independently at meals and able to eat adequate amounts. Problem: Nausea/Vomiting:  Goal: Ability to achieve adequate nutritional intake will improve  Description  Ability to achieve adequate nutritional intake will improve  12/23/2019 2005 by Jeremiah Turk RN  Outcome: Ongoing  Note:   Eating independently at meals and able to eat adequate amounts. Problem: DISCHARGE BARRIERS  Goal: Patient's continuum of care needs are met  12/23/2019 2005 by Jeremiah Turk RN  Outcome: Ongoing  Note:   Pt plans home at discharge. Care manager and social working helping with discharge needs. Problem: Nutrition  Goal: Optimal nutrition therapy  12/23/2019 2005 by Jeremiah Turk RN  Outcome: Ongoing  Note:   Eating independently at meals and able to eat adequate amounts. Care plan reviewed with patient. Patient verbalize understanding of the plan of care and contribute to goal setting.

## 2019-12-25 LAB
ABO: NORMAL
ANTIBODY SCREEN: NORMAL
BASOPHILS # BLD: 0.4 %
BASOPHILS ABSOLUTE: 0 THOU/MM3 (ref 0–0.1)
EOSINOPHIL # BLD: 4.2 %
EOSINOPHILS ABSOLUTE: 0.3 THOU/MM3 (ref 0–0.4)
ERYTHROCYTE [DISTWIDTH] IN BLOOD BY AUTOMATED COUNT: 13.3 % (ref 11.5–14.5)
ERYTHROCYTE [DISTWIDTH] IN BLOOD BY AUTOMATED COUNT: 41.6 FL (ref 35–45)
HCT VFR BLD CALC: 29.3 % (ref 37–47)
HEMOGLOBIN: 10.1 GM/DL (ref 12–16)
IMMATURE GRANS (ABS): 0.04 THOU/MM3 (ref 0–0.07)
IMMATURE GRANULOCYTES: 0.5 %
LYMPHOCYTES # BLD: 32.9 %
LYMPHOCYTES ABSOLUTE: 2.7 THOU/MM3 (ref 1–4.8)
MCH RBC QN AUTO: 29.9 PG (ref 26–33)
MCHC RBC AUTO-ENTMCNC: 34.5 GM/DL (ref 32.2–35.5)
MCV RBC AUTO: 86.7 FL (ref 81–99)
MONOCYTES # BLD: 5.6 %
MONOCYTES ABSOLUTE: 0.5 THOU/MM3 (ref 0.4–1.3)
NUCLEATED RED BLOOD CELLS: 0 /100 WBC
PLATELET # BLD: 331 THOU/MM3 (ref 130–400)
PMV BLD AUTO: 8.8 FL (ref 9.4–12.4)
RBC # BLD: 3.38 MILL/MM3 (ref 4.2–5.4)
RH FACTOR: NORMAL
SEG NEUTROPHILS: 56.4 %
SEGMENTED NEUTROPHILS ABSOLUTE COUNT: 4.7 THOU/MM3 (ref 1.8–7.7)
WBC # BLD: 8.3 THOU/MM3 (ref 4.8–10.8)

## 2019-12-25 PROCEDURE — 6360000002 HC RX W HCPCS: Performed by: PHYSICIAN ASSISTANT

## 2019-12-25 PROCEDURE — 85025 COMPLETE CBC W/AUTO DIFF WBC: CPT

## 2019-12-25 PROCEDURE — 86901 BLOOD TYPING SEROLOGIC RH(D): CPT

## 2019-12-25 PROCEDURE — 6370000000 HC RX 637 (ALT 250 FOR IP): Performed by: PHYSICIAN ASSISTANT

## 2019-12-25 PROCEDURE — 86850 RBC ANTIBODY SCREEN: CPT

## 2019-12-25 PROCEDURE — 1200000000 HC SEMI PRIVATE

## 2019-12-25 PROCEDURE — 51702 INSERT TEMP BLADDER CATH: CPT

## 2019-12-25 PROCEDURE — 86923 COMPATIBILITY TEST ELECTRIC: CPT

## 2019-12-25 PROCEDURE — 86900 BLOOD TYPING SEROLOGIC ABO: CPT

## 2019-12-25 PROCEDURE — 36415 COLL VENOUS BLD VENIPUNCTURE: CPT

## 2019-12-25 PROCEDURE — 2580000003 HC RX 258: Performed by: PHYSICIAN ASSISTANT

## 2019-12-25 PROCEDURE — 51798 US URINE CAPACITY MEASURE: CPT

## 2019-12-25 RX ORDER — SERTRALINE HYDROCHLORIDE 100 MG/1
100 TABLET, FILM COATED ORAL DAILY
Status: DISCONTINUED | OUTPATIENT
Start: 2019-12-26 | End: 2019-12-29 | Stop reason: HOSPADM

## 2019-12-25 RX ORDER — SODIUM CHLORIDE 0.9 % (FLUSH) 0.9 %
10 SYRINGE (ML) INJECTION EVERY 12 HOURS SCHEDULED
Status: DISCONTINUED | OUTPATIENT
Start: 2019-12-25 | End: 2019-12-26

## 2019-12-25 RX ORDER — SODIUM CHLORIDE 0.9 % (FLUSH) 0.9 %
10 SYRINGE (ML) INJECTION PRN
Status: DISCONTINUED | OUTPATIENT
Start: 2019-12-25 | End: 2019-12-26

## 2019-12-25 RX ADMIN — HYDROMORPHONE HYDROCHLORIDE 0.5 MG: 1 INJECTION, SOLUTION INTRAMUSCULAR; INTRAVENOUS; SUBCUTANEOUS at 08:08

## 2019-12-25 RX ADMIN — BUTALBITAL, ACETAMINOPHEN, AND CAFFEINE 1 TABLET: 50; 325; 40 TABLET ORAL at 05:41

## 2019-12-25 RX ADMIN — OXYCODONE HYDROCHLORIDE AND ACETAMINOPHEN 1 TABLET: 10; 325 TABLET ORAL at 13:19

## 2019-12-25 RX ADMIN — ONDANSETRON 4 MG: 2 INJECTION INTRAMUSCULAR; INTRAVENOUS at 17:56

## 2019-12-25 RX ADMIN — LORAZEPAM 0.5 MG: 0.5 TABLET ORAL at 20:45

## 2019-12-25 RX ADMIN — GABAPENTIN 300 MG: 300 CAPSULE ORAL at 20:45

## 2019-12-25 RX ADMIN — DOCUSATE SODIUM 100 MG: 100 CAPSULE, LIQUID FILLED ORAL at 20:45

## 2019-12-25 RX ADMIN — CYCLOBENZAPRINE 10 MG: 10 TABLET, FILM COATED ORAL at 16:38

## 2019-12-25 RX ADMIN — CYCLOBENZAPRINE 10 MG: 10 TABLET, FILM COATED ORAL at 20:45

## 2019-12-25 RX ADMIN — POLYETHYLENE GLYCOL 3350 17 G: 17 POWDER, FOR SOLUTION ORAL at 08:16

## 2019-12-25 RX ADMIN — ONDANSETRON 4 MG: 2 INJECTION INTRAMUSCULAR; INTRAVENOUS at 08:08

## 2019-12-25 RX ADMIN — OXYCODONE HYDROCHLORIDE AND ACETAMINOPHEN 1 TABLET: 10; 325 TABLET ORAL at 09:25

## 2019-12-25 RX ADMIN — CYCLOBENZAPRINE 10 MG: 10 TABLET, FILM COATED ORAL at 08:16

## 2019-12-25 RX ADMIN — BUTALBITAL, ACETAMINOPHEN, AND CAFFEINE 1 TABLET: 50; 325; 40 TABLET ORAL at 11:30

## 2019-12-25 RX ADMIN — SODIUM CHLORIDE, PRESERVATIVE FREE 10 ML: 5 INJECTION INTRAVENOUS at 20:44

## 2019-12-25 RX ADMIN — DOCUSATE SODIUM 100 MG: 100 CAPSULE, LIQUID FILLED ORAL at 08:16

## 2019-12-25 RX ADMIN — BUTALBITAL, ACETAMINOPHEN, AND CAFFEINE 1 TABLET: 50; 325; 40 TABLET ORAL at 01:00

## 2019-12-25 RX ADMIN — OXYCODONE HYDROCHLORIDE AND ACETAMINOPHEN 1 TABLET: 10; 325 TABLET ORAL at 17:56

## 2019-12-25 ASSESSMENT — PAIN SCALES - GENERAL
PAINLEVEL_OUTOF10: 6
PAINLEVEL_OUTOF10: 8
PAINLEVEL_OUTOF10: 4
PAINLEVEL_OUTOF10: 8
PAINLEVEL_OUTOF10: 7
PAINLEVEL_OUTOF10: 7
PAINLEVEL_OUTOF10: 4
PAINLEVEL_OUTOF10: 5
PAINLEVEL_OUTOF10: 5
PAINLEVEL_OUTOF10: 4
PAINLEVEL_OUTOF10: 8
PAINLEVEL_OUTOF10: 3
PAINLEVEL_OUTOF10: 7
PAINLEVEL_OUTOF10: 9
PAINLEVEL_OUTOF10: 6

## 2019-12-25 ASSESSMENT — PAIN DESCRIPTION - DESCRIPTORS
DESCRIPTORS: ACHING
DESCRIPTORS: BURNING;HEADACHE

## 2019-12-25 ASSESSMENT — PAIN DESCRIPTION - LOCATION
LOCATION: BACK;HEAD
LOCATION: HEAD;BACK
LOCATION: HEAD;BACK

## 2019-12-25 ASSESSMENT — PAIN DESCRIPTION - ONSET
ONSET: ON-GOING
ONSET: ON-GOING

## 2019-12-25 ASSESSMENT — PAIN DESCRIPTION - PAIN TYPE
TYPE: CHRONIC PAIN;ACUTE PAIN
TYPE: ACUTE PAIN
TYPE: ACUTE PAIN

## 2019-12-25 ASSESSMENT — PAIN DESCRIPTION - FREQUENCY
FREQUENCY: CONTINUOUS
FREQUENCY: CONTINUOUS

## 2019-12-25 ASSESSMENT — PAIN DESCRIPTION - PROGRESSION
CLINICAL_PROGRESSION: GRADUALLY WORSENING
CLINICAL_PROGRESSION: NOT CHANGED

## 2019-12-25 ASSESSMENT — PAIN DESCRIPTION - ORIENTATION
ORIENTATION: ANTERIOR;LOWER

## 2019-12-25 NOTE — PROGRESS NOTES
Department of Orthopedic Surgery  Spine Service  Progress Note        Subjective: Daine Eisenmenger continues to complain of HA made worse with lights and sounds. States it is located behind her eyes and radiates to the top of her head. No change in HA when laying flat vs sitting at 30 degrees. Constipation has been causing her to strain for BM. She has urinary retention and catheter has been placed. Denies vomitting. Drains increased from 70 to 360. Very clear and thin fluid, trace amounts of blood. Pt states after taking pain medicine and nausea medicine she is able to relax and feels much better. Has not been OOB.     Vitals  VITALS:  BP (!) 109/57   Pulse 91   Temp 97.5 °F (36.4 °C) (Oral)   Resp 16   Ht 5' 5\" (1.651 m)   Wt 127 lb (57.6 kg)   SpO2 96%   BMI 21.13 kg/m²   24HR INTAKE/OUTPUT:    Intake/Output Summary (Last 24 hours) at 12/25/2019 0820  Last data filed at 12/25/2019 0420  Gross per 24 hour   Intake 1699.6 ml   Output 3410 ml   Net -1710.4 ml     URINARY CATHETER OUTPUT (Guerrero):  [REMOVED] Urethral Catheter 16 fr-Output (mL): 850 mL  Urethral Catheter-Output (mL): 1650 mL  DRAIN/TUBE OUTPUT:  [REMOVED] Closed/Suction Drain Inferior Back-Output (ml): 150 ml  [REMOVED] Closed/Suction Drain Back-Output (ml): 0 ml  Closed/Suction Drain Inferior;Midline Back Accordion 10 [place]-Output (ml): 100 ml      PHYSICAL EXAM:    Orientation:  alert and oriented to person, place and time    Incision:  dressing in place, clean, dry and intact      Lower Extremity Motor :  quadriceps, extensor hallucis longus, dorsiflexion, plantarflexion 5/5 bilaterally  Lower Extremity Sensory:  Intact L1-S1    Flatus:  positive    ABNORMAL EXAM FINDINGS:  none    LABS:  Recent Labs     12/25/19  0555   HGB 10.1*   HCT 29.3*       ASSESSMENT AND PLAN:    Post operative day 4    1:  Monitor labs and drain output  2:  Activity Level:  Bedrest - flat  3:  Pain Control: Dilaudid added yesterday for pain control  4:  Discharge Planning:

## 2019-12-25 NOTE — PLAN OF CARE
Problem: Falls - Risk of:  Goal: Will remain free from falls  Description  Will remain free from falls  Outcome: Ongoing  Note:   Pt using call light appropriately to call for assistance. Pt reports understanding of fall prevention when discussed. Problem: Falls - Risk of:  Goal: Absence of physical injury  Description  Absence of physical injury  Outcome: Ongoing  Note:   Pt using call light appropriately to call for assistance. Pt reports understanding of fall prevention when discussed. Problem: Risk for Impaired Skin Integrity  Goal: Tissue integrity - skin and mucous membranes  Description  Structural intactness and normal physiological function of skin and  mucous membranes. Outcome: Ongoing  Note:   Pt's surgical incision healing. Dressing is dry and intact and not due to be changed today. No other skin impairments noted. Pt understands the importance of frequent repositioning in order to prevent any skin breakdown. Problem: Pain:  Goal: Pain level will decrease  Description  Pain level will decrease  Outcome: Ongoing  Note:   Pt report pain at 6-10 on scale. Pt states oral medication helping to achieve pain goal of a 5 on scale. Problem: Pain:  Goal: Control of acute pain  Description  Control of acute pain  Outcome: Ongoing  Note:   Pt report pain at 6-10 on scale. Pt states oral medication helping to achieve pain goal of a 5 on scale. Problem: SAFETY  Goal: Free from accidental physical injury  Outcome: Ongoing  Note:   Pt using call light appropriately to call for assistance. Pt reports understanding of fall prevention when discussed. Problem: SAFETY  Goal: Free from intentional harm  Outcome: Ongoing  Note:   Pt using call light appropriately to call for assistance. Pt reports understanding of fall prevention when discussed. Problem: DAILY CARE  Goal: Daily care needs are met  Outcome: Ongoing  Note:   Able to move in bed self and feed self.  Minimal assistance needed from staff. Problem: Nausea/Vomiting:  Goal: Absence of nausea/vomiting  Description  Absence of nausea/vomiting  Outcome: Ongoing  Note:   Denies nausea and vomitting. Problem: Nausea/Vomiting:  Goal: Able to drink  Description  Able to drink  Outcome: Ongoing  Note:   Able to drink per self. Problem: Nausea/Vomiting:  Goal: Able to eat  Description  Able to eat  Outcome: Ongoing  Note:   Eating minimal at meal times. Problem: Nausea/Vomiting:  Goal: Ability to achieve adequate nutritional intake will improve  Description  Ability to achieve adequate nutritional intake will improve  Outcome: Ongoing  Note:   Eating minimal amounts. Problem: DISCHARGE BARRIERS  Goal: Patient's continuum of care needs are met  Outcome: Ongoing  Note:   Pt plans home at discharge. Care manager and social working helping with discharge needs. Problem: PAIN  Goal: Patient's pain/discomfort is manageable  Outcome: Not Met This Shift  Note:   Pt report pain at 6-10 on scale. Pt states oral medication helping to achieve pain goal of a 5 on scale. Care plan reviewed with patient. Patient verbalize understanding of the plan of care and contribute to goal setting.

## 2019-12-25 NOTE — PLAN OF CARE
Problem: Falls - Risk of:  Goal: Will remain free from falls  Description  Will remain free from falls  12/25/2019 0133 by Edward Ramsey RN  Outcome: Ongoing  Note:   Patient remained free of falls this shift. Fall precautions in place. Items within reach, call light within reach and side rails up x2. Bed alarm is on. Hourly rounding in place. Patient is currently on bedrest due to dura repair. Will monitor. Problem: Risk for Impaired Skin Integrity  Goal: Tissue integrity - skin and mucous membranes  Description  Structural intactness and normal physiological function of skin and  mucous membranes. 12/25/2019 0133 by Edward Ramsey RN  Outcome: Ongoing  Note:   Full skin assessment completed. Patient is able to turn and reposition self. Will monitor skin integrity. Problem: Pain:  Goal: Pain level will decrease  Description  Pain level will decrease  12/25/2019 0133 by Edward Ramsey RN  Outcome: Ongoing  Note:   Patient last rated pain at a 4/10. Oral pain medications to help reduce pain. Rest and repositioning in place as nonpharmacologic pain relief methods. Both working towards patient's pain goal of 2/10. Will monitor. Problem: SAFETY  Goal: Free from accidental physical injury  12/25/2019 0133 by Edward Ramsey RN  Outcome: Ongoing  Note:   No injury has occurred this shift. Hourly rounding in place. Problem: DAILY CARE  Goal: Daily care needs are met  12/25/2019 0133 by Edward Ramsey RN  Outcome: Ongoing  Note:   Patient able to verbalize needs. Assisting as they arise. Will monitor. Problem: Nausea/Vomiting:  Goal: Absence of nausea/vomiting  Description  Absence of nausea/vomiting  12/25/2019 0133 by Edward Ramsey RN  Outcome: Ongoing  Note:   No nausea or vomiting noted so far this shift. Will monitor.       Problem: DISCHARGE BARRIERS  Goal: Patient's continuum of care needs are met  12/25/2019 0133 by Edward Ramsey RN  Outcome: Ongoing  Note:   Patient is planning home at discharge. Will monitor. Problem: Nutrition  Goal: Optimal nutrition therapy  12/25/2019 0133 by Gamal Snyder RN  Outcome: Ongoing  Note:   Encouraged oral intake throughout shift. 0.9 at 125 in place. Will monitor. Care plan reviewed with patient. Patient  verbalize understanding of the plan of care and contribute to goal setting.

## 2019-12-26 ENCOUNTER — ANESTHESIA EVENT (OUTPATIENT)
Dept: OPERATING ROOM | Age: 59
DRG: 029 | End: 2019-12-26
Payer: COMMERCIAL

## 2019-12-26 ENCOUNTER — ANESTHESIA (OUTPATIENT)
Dept: OPERATING ROOM | Age: 59
DRG: 029 | End: 2019-12-26
Payer: COMMERCIAL

## 2019-12-26 VITALS
RESPIRATION RATE: 21 BRPM | TEMPERATURE: 97.7 F | DIASTOLIC BLOOD PRESSURE: 72 MMHG | OXYGEN SATURATION: 100 % | SYSTOLIC BLOOD PRESSURE: 136 MMHG

## 2019-12-26 PROCEDURE — 2580000003 HC RX 258: Performed by: PHYSICIAN ASSISTANT

## 2019-12-26 PROCEDURE — 2500000003 HC RX 250 WO HCPCS: Performed by: NURSE ANESTHETIST, CERTIFIED REGISTERED

## 2019-12-26 PROCEDURE — 3700000000 HC ANESTHESIA ATTENDED CARE: Performed by: ORTHOPAEDIC SURGERY

## 2019-12-26 PROCEDURE — 6370000000 HC RX 637 (ALT 250 FOR IP): Performed by: PHYSICIAN ASSISTANT

## 2019-12-26 PROCEDURE — 6360000002 HC RX W HCPCS: Performed by: NURSE ANESTHETIST, CERTIFIED REGISTERED

## 2019-12-26 PROCEDURE — 2500000003 HC RX 250 WO HCPCS: Performed by: ORTHOPAEDIC SURGERY

## 2019-12-26 PROCEDURE — 3600000014 HC SURGERY LEVEL 4 ADDTL 15MIN: Performed by: ORTHOPAEDIC SURGERY

## 2019-12-26 PROCEDURE — 3600000004 HC SURGERY LEVEL 4 BASE: Performed by: ORTHOPAEDIC SURGERY

## 2019-12-26 PROCEDURE — 7100000000 HC PACU RECOVERY - FIRST 15 MIN: Performed by: ORTHOPAEDIC SURGERY

## 2019-12-26 PROCEDURE — 3700000001 HC ADD 15 MINUTES (ANESTHESIA): Performed by: ORTHOPAEDIC SURGERY

## 2019-12-26 PROCEDURE — 00QT0ZZ REPAIR SPINAL MENINGES, OPEN APPROACH: ICD-10-PCS | Performed by: ORTHOPAEDIC SURGERY

## 2019-12-26 PROCEDURE — 6370000000 HC RX 637 (ALT 250 FOR IP): Performed by: INTERNAL MEDICINE

## 2019-12-26 PROCEDURE — 7100000001 HC PACU RECOVERY - ADDTL 15 MIN: Performed by: ORTHOPAEDIC SURGERY

## 2019-12-26 PROCEDURE — 1200000000 HC SEMI PRIVATE

## 2019-12-26 PROCEDURE — 6360000002 HC RX W HCPCS: Performed by: PHYSICIAN ASSISTANT

## 2019-12-26 PROCEDURE — 2709999900 HC NON-CHARGEABLE SUPPLY: Performed by: ORTHOPAEDIC SURGERY

## 2019-12-26 PROCEDURE — 6360000002 HC RX W HCPCS

## 2019-12-26 PROCEDURE — 6360000002 HC RX W HCPCS: Performed by: ANESTHESIOLOGY

## 2019-12-26 RX ORDER — MEPERIDINE HYDROCHLORIDE 25 MG/ML
12.5 INJECTION INTRAMUSCULAR; INTRAVENOUS; SUBCUTANEOUS EVERY 5 MIN PRN
Status: DISCONTINUED | OUTPATIENT
Start: 2019-12-26 | End: 2019-12-26

## 2019-12-26 RX ORDER — SODIUM CHLORIDE 0.9 % (FLUSH) 0.9 %
10 SYRINGE (ML) INJECTION EVERY 12 HOURS SCHEDULED
Status: DISCONTINUED | OUTPATIENT
Start: 2019-12-26 | End: 2019-12-29 | Stop reason: HOSPADM

## 2019-12-26 RX ORDER — MIDAZOLAM HYDROCHLORIDE 1 MG/ML
INJECTION INTRAMUSCULAR; INTRAVENOUS PRN
Status: DISCONTINUED | OUTPATIENT
Start: 2019-12-26 | End: 2019-12-26 | Stop reason: SDUPTHER

## 2019-12-26 RX ORDER — HYDROCODONE BITARTRATE AND ACETAMINOPHEN 5; 325 MG/1; MG/1
1 TABLET ORAL EVERY 4 HOURS PRN
Status: DISCONTINUED | OUTPATIENT
Start: 2019-12-26 | End: 2019-12-29 | Stop reason: HOSPADM

## 2019-12-26 RX ORDER — GABAPENTIN 300 MG/1
300 CAPSULE ORAL DAILY PRN
Status: DISCONTINUED | OUTPATIENT
Start: 2019-12-26 | End: 2019-12-29 | Stop reason: HOSPADM

## 2019-12-26 RX ORDER — LIDOCAINE HCL/PF 100 MG/5ML
SYRINGE (ML) INJECTION PRN
Status: DISCONTINUED | OUTPATIENT
Start: 2019-12-26 | End: 2019-12-26 | Stop reason: SDUPTHER

## 2019-12-26 RX ORDER — ONDANSETRON 2 MG/ML
INJECTION INTRAMUSCULAR; INTRAVENOUS PRN
Status: DISCONTINUED | OUTPATIENT
Start: 2019-12-26 | End: 2019-12-26 | Stop reason: SDUPTHER

## 2019-12-26 RX ORDER — HYDROCODONE BITARTRATE AND ACETAMINOPHEN 5; 325 MG/1; MG/1
2 TABLET ORAL EVERY 4 HOURS PRN
Status: DISCONTINUED | OUTPATIENT
Start: 2019-12-26 | End: 2019-12-29 | Stop reason: HOSPADM

## 2019-12-26 RX ORDER — METOCLOPRAMIDE HYDROCHLORIDE 5 MG/ML
10 INJECTION INTRAMUSCULAR; INTRAVENOUS
Status: DISCONTINUED | OUTPATIENT
Start: 2019-12-26 | End: 2019-12-26

## 2019-12-26 RX ORDER — LIDOCAINE HYDROCHLORIDE AND EPINEPHRINE 10; 10 MG/ML; UG/ML
INJECTION, SOLUTION INFILTRATION; PERINEURAL PRN
Status: DISCONTINUED | OUTPATIENT
Start: 2019-12-26 | End: 2019-12-26 | Stop reason: ALTCHOICE

## 2019-12-26 RX ORDER — SODIUM CHLORIDE 0.9 % (FLUSH) 0.9 %
10 SYRINGE (ML) INJECTION PRN
Status: DISCONTINUED | OUTPATIENT
Start: 2019-12-26 | End: 2019-12-29 | Stop reason: HOSPADM

## 2019-12-26 RX ORDER — SODIUM CHLORIDE 9 MG/ML
INJECTION, SOLUTION INTRAVENOUS CONTINUOUS
Status: DISCONTINUED | OUTPATIENT
Start: 2019-12-26 | End: 2019-12-27

## 2019-12-26 RX ORDER — DOCUSATE SODIUM 100 MG/1
100 CAPSULE, LIQUID FILLED ORAL 2 TIMES DAILY
Status: DISCONTINUED | OUTPATIENT
Start: 2019-12-26 | End: 2019-12-29 | Stop reason: HOSPADM

## 2019-12-26 RX ORDER — FENTANYL CITRATE 50 UG/ML
50 INJECTION, SOLUTION INTRAMUSCULAR; INTRAVENOUS EVERY 5 MIN PRN
Status: DISCONTINUED | OUTPATIENT
Start: 2019-12-26 | End: 2019-12-26

## 2019-12-26 RX ORDER — FENTANYL CITRATE 50 UG/ML
25 INJECTION, SOLUTION INTRAMUSCULAR; INTRAVENOUS EVERY 5 MIN PRN
Status: DISCONTINUED | OUTPATIENT
Start: 2019-12-26 | End: 2019-12-26

## 2019-12-26 RX ORDER — SUCCINYLCHOLINE/SOD CL,ISO/PF 200MG/10ML
SYRINGE (ML) INTRAVENOUS PRN
Status: DISCONTINUED | OUTPATIENT
Start: 2019-12-26 | End: 2019-12-26 | Stop reason: SDUPTHER

## 2019-12-26 RX ORDER — LABETALOL 20 MG/4 ML (5 MG/ML) INTRAVENOUS SYRINGE
5 EVERY 10 MIN PRN
Status: DISCONTINUED | OUTPATIENT
Start: 2019-12-26 | End: 2019-12-26

## 2019-12-26 RX ORDER — PROMETHAZINE HYDROCHLORIDE 25 MG/ML
12.5 INJECTION, SOLUTION INTRAMUSCULAR; INTRAVENOUS
Status: DISCONTINUED | OUTPATIENT
Start: 2019-12-26 | End: 2019-12-26

## 2019-12-26 RX ORDER — DEXAMETHASONE SODIUM PHOSPHATE 4 MG/ML
INJECTION, SOLUTION INTRA-ARTICULAR; INTRALESIONAL; INTRAMUSCULAR; INTRAVENOUS; SOFT TISSUE PRN
Status: DISCONTINUED | OUTPATIENT
Start: 2019-12-26 | End: 2019-12-26 | Stop reason: SDUPTHER

## 2019-12-26 RX ORDER — FENTANYL CITRATE 50 UG/ML
INJECTION, SOLUTION INTRAMUSCULAR; INTRAVENOUS PRN
Status: DISCONTINUED | OUTPATIENT
Start: 2019-12-26 | End: 2019-12-26 | Stop reason: SDUPTHER

## 2019-12-26 RX ORDER — DIPHENHYDRAMINE HYDROCHLORIDE 50 MG/ML
12.5 INJECTION INTRAMUSCULAR; INTRAVENOUS
Status: DISCONTINUED | OUTPATIENT
Start: 2019-12-26 | End: 2019-12-26

## 2019-12-26 RX ORDER — PROPOFOL 10 MG/ML
INJECTION, EMULSION INTRAVENOUS PRN
Status: DISCONTINUED | OUTPATIENT
Start: 2019-12-26 | End: 2019-12-26 | Stop reason: SDUPTHER

## 2019-12-26 RX ADMIN — GABAPENTIN 300 MG: 300 CAPSULE ORAL at 21:49

## 2019-12-26 RX ADMIN — ONDANSETRON HYDROCHLORIDE 4 MG: 4 INJECTION, SOLUTION INTRAMUSCULAR; INTRAVENOUS at 14:30

## 2019-12-26 RX ADMIN — SODIUM CHLORIDE: 9 INJECTION, SOLUTION INTRAVENOUS at 16:39

## 2019-12-26 RX ADMIN — PROPOFOL 150 MG: 10 INJECTION, EMULSION INTRAVENOUS at 14:25

## 2019-12-26 RX ADMIN — HYDROCODONE BITARTRATE AND ACETAMINOPHEN 2 TABLET: 5; 325 TABLET ORAL at 21:49

## 2019-12-26 RX ADMIN — DOCUSATE SODIUM 100 MG: 100 CAPSULE, LIQUID FILLED ORAL at 21:49

## 2019-12-26 RX ADMIN — HYDROMORPHONE HYDROCHLORIDE 0.5 MG: 1 INJECTION, SOLUTION INTRAMUSCULAR; INTRAVENOUS; SUBCUTANEOUS at 16:13

## 2019-12-26 RX ADMIN — ONDANSETRON 4 MG: 2 INJECTION INTRAMUSCULAR; INTRAVENOUS at 08:01

## 2019-12-26 RX ADMIN — FENTANYL CITRATE 75 MCG: 50 INJECTION INTRAMUSCULAR; INTRAVENOUS at 15:54

## 2019-12-26 RX ADMIN — CEFAZOLIN SODIUM 2 G: 10 INJECTION, POWDER, FOR SOLUTION INTRAVENOUS at 18:45

## 2019-12-26 RX ADMIN — SERTRALINE 100 MG: 100 TABLET, FILM COATED ORAL at 07:51

## 2019-12-26 RX ADMIN — SODIUM CHLORIDE: 9 INJECTION, SOLUTION INTRAVENOUS at 14:43

## 2019-12-26 RX ADMIN — HYDROCODONE BITARTRATE AND ACETAMINOPHEN 2 TABLET: 5; 325 TABLET ORAL at 17:52

## 2019-12-26 RX ADMIN — ONDANSETRON 4 MG: 2 INJECTION INTRAMUSCULAR; INTRAVENOUS at 00:00

## 2019-12-26 RX ADMIN — BUTALBITAL, ACETAMINOPHEN, AND CAFFEINE 1 TABLET: 50; 325; 40 TABLET ORAL at 07:51

## 2019-12-26 RX ADMIN — DEXAMETHASONE SODIUM PHOSPHATE 10 MG: 4 INJECTION, SOLUTION INTRAMUSCULAR; INTRAVENOUS at 14:30

## 2019-12-26 RX ADMIN — OXYCODONE HYDROCHLORIDE AND ACETAMINOPHEN 1 TABLET: 10; 325 TABLET ORAL at 00:00

## 2019-12-26 RX ADMIN — SODIUM CHLORIDE: 9 INJECTION, SOLUTION INTRAVENOUS at 08:02

## 2019-12-26 RX ADMIN — MIDAZOLAM HYDROCHLORIDE 2 MG: 1 INJECTION, SOLUTION INTRAMUSCULAR; INTRAVENOUS at 14:22

## 2019-12-26 RX ADMIN — FENTANYL CITRATE 25 MCG: 50 INJECTION INTRAMUSCULAR; INTRAVENOUS at 15:38

## 2019-12-26 RX ADMIN — HYDROMORPHONE HYDROCHLORIDE 0.5 MG: 1 INJECTION, SOLUTION INTRAMUSCULAR; INTRAVENOUS; SUBCUTANEOUS at 16:18

## 2019-12-26 RX ADMIN — SODIUM CHLORIDE: 9 INJECTION, SOLUTION INTRAVENOUS at 00:07

## 2019-12-26 RX ADMIN — Medication 100 MG: at 14:25

## 2019-12-26 RX ADMIN — CYCLOBENZAPRINE 10 MG: 10 TABLET, FILM COATED ORAL at 16:12

## 2019-12-26 RX ADMIN — LABETALOL HYDROCHLORIDE 100 MG: 100 TABLET, FILM COATED ORAL at 07:51

## 2019-12-26 RX ADMIN — FENTANYL CITRATE 100 MCG: 50 INJECTION INTRAMUSCULAR; INTRAVENOUS at 14:25

## 2019-12-26 RX ADMIN — Medication 120 MG: at 14:25

## 2019-12-26 RX ADMIN — LORAZEPAM 0.5 MG: 0.5 TABLET ORAL at 21:49

## 2019-12-26 ASSESSMENT — LIFESTYLE VARIABLES: SMOKING_STATUS: 1

## 2019-12-26 ASSESSMENT — PULMONARY FUNCTION TESTS
PIF_VALUE: 13
PIF_VALUE: 13
PIF_VALUE: 0
PIF_VALUE: 13
PIF_VALUE: 16
PIF_VALUE: 4
PIF_VALUE: 14
PIF_VALUE: 4
PIF_VALUE: 3
PIF_VALUE: 13
PIF_VALUE: 2
PIF_VALUE: 2
PIF_VALUE: 13
PIF_VALUE: 1
PIF_VALUE: 1
PIF_VALUE: 2
PIF_VALUE: 16
PIF_VALUE: 13
PIF_VALUE: 40
PIF_VALUE: 12
PIF_VALUE: 14
PIF_VALUE: 2
PIF_VALUE: 1
PIF_VALUE: 2
PIF_VALUE: 1
PIF_VALUE: 2
PIF_VALUE: 2
PIF_VALUE: 3
PIF_VALUE: 16
PIF_VALUE: 9
PIF_VALUE: 13
PIF_VALUE: 2
PIF_VALUE: 6
PIF_VALUE: 14
PIF_VALUE: 7
PIF_VALUE: 6
PIF_VALUE: 2
PIF_VALUE: 2
PIF_VALUE: 15
PIF_VALUE: 13
PIF_VALUE: 44
PIF_VALUE: 1
PIF_VALUE: 0
PIF_VALUE: 12
PIF_VALUE: 5
PIF_VALUE: 1
PIF_VALUE: 11
PIF_VALUE: 2
PIF_VALUE: 6
PIF_VALUE: 6
PIF_VALUE: 13
PIF_VALUE: 2
PIF_VALUE: 16
PIF_VALUE: 11
PIF_VALUE: 12
PIF_VALUE: 10
PIF_VALUE: 2
PIF_VALUE: 14
PIF_VALUE: 13
PIF_VALUE: 14
PIF_VALUE: 1
PIF_VALUE: 13
PIF_VALUE: 13
PIF_VALUE: 16
PIF_VALUE: 11
PIF_VALUE: 2
PIF_VALUE: 13
PIF_VALUE: 0
PIF_VALUE: 2
PIF_VALUE: 16
PIF_VALUE: 16
PIF_VALUE: 14
PIF_VALUE: 13
PIF_VALUE: 17
PIF_VALUE: 2
PIF_VALUE: 6
PIF_VALUE: 13
PIF_VALUE: 2
PIF_VALUE: 11
PIF_VALUE: 10
PIF_VALUE: 3
PIF_VALUE: 16
PIF_VALUE: 14
PIF_VALUE: 14
PIF_VALUE: 3
PIF_VALUE: 14
PIF_VALUE: 2

## 2019-12-26 ASSESSMENT — PAIN SCALES - GENERAL
PAINLEVEL_OUTOF10: 7
PAINLEVEL_OUTOF10: 6
PAINLEVEL_OUTOF10: 7
PAINLEVEL_OUTOF10: 9
PAINLEVEL_OUTOF10: 8
PAINLEVEL_OUTOF10: 7
PAINLEVEL_OUTOF10: 4
PAINLEVEL_OUTOF10: 6
PAINLEVEL_OUTOF10: 9
PAINLEVEL_OUTOF10: 8
PAINLEVEL_OUTOF10: 4
PAINLEVEL_OUTOF10: 9
PAINLEVEL_OUTOF10: 8
PAINLEVEL_OUTOF10: 7
PAINLEVEL_OUTOF10: 6
PAINLEVEL_OUTOF10: 7
PAINLEVEL_OUTOF10: 8
PAINLEVEL_OUTOF10: 8

## 2019-12-26 ASSESSMENT — PAIN DESCRIPTION - PROGRESSION
CLINICAL_PROGRESSION: NOT CHANGED
CLINICAL_PROGRESSION: GRADUALLY WORSENING
CLINICAL_PROGRESSION: NOT CHANGED

## 2019-12-26 ASSESSMENT — PAIN - FUNCTIONAL ASSESSMENT
PAIN_FUNCTIONAL_ASSESSMENT: PREVENTS OR INTERFERES SOME ACTIVE ACTIVITIES AND ADLS
PAIN_FUNCTIONAL_ASSESSMENT: ACTIVITIES ARE NOT PREVENTED
PAIN_FUNCTIONAL_ASSESSMENT: PREVENTS OR INTERFERES WITH MANY ACTIVE NOT PASSIVE ACTIVITIES
PAIN_FUNCTIONAL_ASSESSMENT: PREVENTS OR INTERFERES SOME ACTIVE ACTIVITIES AND ADLS

## 2019-12-26 ASSESSMENT — PAIN DESCRIPTION - PAIN TYPE
TYPE: ACUTE PAIN;SURGICAL PAIN
TYPE: ACUTE PAIN
TYPE: SURGICAL PAIN
TYPE: ACUTE PAIN
TYPE: SURGICAL PAIN

## 2019-12-26 ASSESSMENT — PAIN DESCRIPTION - DESCRIPTORS
DESCRIPTORS: ACHING

## 2019-12-26 ASSESSMENT — PAIN DESCRIPTION - ONSET
ONSET: ON-GOING

## 2019-12-26 ASSESSMENT — PAIN DESCRIPTION - FREQUENCY
FREQUENCY: CONTINUOUS

## 2019-12-26 ASSESSMENT — PAIN DESCRIPTION - LOCATION
LOCATION: BACK
LOCATION: BACK
LOCATION: HEAD
LOCATION: BACK
LOCATION: HEAD

## 2019-12-26 ASSESSMENT — PAIN DESCRIPTION - ORIENTATION
ORIENTATION: LOWER;MID
ORIENTATION: LOWER;MID

## 2019-12-26 NOTE — PLAN OF CARE
Problem: Falls - Risk of:  Goal: Will remain free from falls  Description  Will remain free from falls  Outcome: Ongoing  Note:   Pt free from falls this shift. Pt able to use call light to request assistance. Problem: Risk for Impaired Skin Integrity  Goal: Tissue integrity - skin and mucous membranes  Description  Structural intactness and normal physiological function of skin and  mucous membranes. Outcome: Ongoing  Note:   No signs of new skin breakdown . Skin remains warm, dry, intact. Patient understands the importance of frequent repositioning in order to prevent any skin breakdown. Back dressing clean,dry and intact     Problem: Pain:  Goal: Pain level will decrease  Description  Pain level will decrease  Outcome: Ongoing  Note:   Pt report pain at 7/10 on scale. Pt states oral prn pain medication, rest and repositioning are  helping to achieve pain goal of a 2/10 on scale. Problem: SAFETY  Goal: Free from accidental physical injury  Outcome: Ongoing  Note:   Pt free from accidental physical injury. Safe room environment provided. Problem: Nausea/Vomiting:  Goal: Absence of nausea/vomiting  Description  Absence of nausea/vomiting  Outcome: Ongoing  Note:   No n/v noted this shift. Pt had available prn nausea medicine if needed. Problem: DISCHARGE BARRIERS  Goal: Patient's continuum of care needs are met  Outcome: Ongoing  Note:   Pt care needs met this shift. Pt will be discharged home when timer is appropriate. Problem: Nutrition  Goal: Optimal nutrition therapy  Outcome: Ongoing  Note:   Pt NPO thi shift due to schedule procedure in the morning. Problem: DAILY CARE  Goal: Daily care needs are met  Note:   Pt moderate dependent with daily care needs. Care plan reviewed with patient. Patient verbalizes understanding of the plan of care and contribute to goal setting.

## 2019-12-26 NOTE — BRIEF OP NOTE
Brief Postoperative Note  ______________________________________________________________    Patient: Rosalva Odom  YOB: 1960  MRN: [id number]  Date of Procedure: 12/26/2019    Pre-Op Diagnosis: SPINAL STENOSIS    Post-Op Diagnosis: Same       Procedure(s):  LUMBAR EXPLORATION, POSSIBLE DURA LEAK, POSSBLE SUBARACHNOID DRAIN    Anesthesia: General    Surgeon(s):  Traci Coffey MD    Assistant: Stevo Edwards    Estimated Blood Loss (mL): less than 50     Complications: None    Specimens:   * No specimens in log *    Implants:  * No implants in log *      Drains:   Urethral Catheter (Active)   $ Urethral catheter insertion $ Not inserted for procedure 12/25/2019  2:15 AM   Catheter Indications Acute urinary retention/obstruction 12/25/2019  2:15 AM   Site Assessment No urethral drainage 12/25/2019  2:15 AM   Urine Color Yellow 12/25/2019  2:15 AM   Urine Appearance Clear 12/25/2019  2:15 AM   Output (mL) 1750 mL 12/26/2019  3:15 AM       [REMOVED] Closed/Suction Drain Inferior Back (Removed)   Output (ml) 150 ml 12/20/2019  3:48 AM       [REMOVED] Closed/Suction Drain Back (Removed)   Output (ml) 0 ml 12/20/2019  3:48 AM       [REMOVED] Closed/Suction Drain Inferior;Midline Back Accordion 10 [place] (Removed)   Site Description Unable to view 12/25/2019  8:30 PM   Dressing Status Clean;Dry; Intact 12/25/2019  8:30 PM   Drainage Appearance Yellow;Serosanguinous 12/25/2019  8:30 PM   Status Compressed 12/25/2019  8:30 PM   Output (ml) 120 ml 12/26/2019  3:15 AM       [REMOVED] Urethral Catheter 16 fr (Removed)   Catheter Indications Perioperative use in selected surgeries including but not limited to urologic, pelvic or need for intraoperative monitoring of urinary output due to prolonged surgery, large volume infusion or need for diuretic therapy in surgery 12/23/2019  5:30 AM   Site Assessment No urethral drainage 12/23/2019  5:30 AM   Urine Color Yellow 12/23/2019  2:44 PM   Urine Appearance Clear 12/23/2019

## 2019-12-26 NOTE — PROGRESS NOTES
[facility]  PHYSICAL THERAPY MISSED TREATMENT NOTE  ACUTE CARE    Date: 2019  Patient Name: Lucien Cabrera        MRN: [id number]   : 1960  (64 y.o.)  Gender: female                REASON FOR MISSED TREATMENT:  Hold treatment per nursing request.  Trish Montgomery RN, just spoke with Dr Jami Arreola regarding pt not tolerating sitting up at 45 deg more than 5-10 min. Pt is scheduled for further surgery at 2 pm today to explore lumbar wound due to continued headaches with sitting up. Will sign off and request new orders when appropriate. Junito Yadav.  Sarah Ruiz, [place] [facility]

## 2019-12-26 NOTE — PROGRESS NOTES
[id number] Pt transferred to PACU, see flow sheet for assessment. 200 Pt is complaining of burning sensation to her back, surgical site. Denies nausea. Pt tolerated swallowing pill and water with no difficulties.    1640 Report called to [facility] [employer], [address] [place] [address] Street  0179 Pt transferred to [facility], no one in waiting room

## 2019-12-27 LAB
BASOPHILS # BLD: 0.4 %
BASOPHILS ABSOLUTE: 0.1 THOU/MM3 (ref 0–0.1)
EOSINOPHIL # BLD: 0.9 %
EOSINOPHILS ABSOLUTE: 0.1 THOU/MM3 (ref 0–0.4)
ERYTHROCYTE [DISTWIDTH] IN BLOOD BY AUTOMATED COUNT: 13.4 % (ref 11.5–14.5)
ERYTHROCYTE [DISTWIDTH] IN BLOOD BY AUTOMATED COUNT: 46.7 FL (ref 35–45)
HCT VFR BLD CALC: 32 % (ref 37–47)
HEMOGLOBIN: 10.1 GM/DL (ref 12–16)
IMMATURE GRANS (ABS): 0.07 THOU/MM3 (ref 0–0.07)
IMMATURE GRANULOCYTES: 0.4 %
LYMPHOCYTES # BLD: 18.4 %
LYMPHOCYTES ABSOLUTE: 3 THOU/MM3 (ref 1–4.8)
MCH RBC QN AUTO: 30.1 PG (ref 26–33)
MCHC RBC AUTO-ENTMCNC: 31.6 GM/DL (ref 32.2–35.5)
MCV RBC AUTO: 95.5 FL (ref 81–99)
MONOCYTES # BLD: 4.5 %
MONOCYTES ABSOLUTE: 0.7 THOU/MM3 (ref 0.4–1.3)
NUCLEATED RED BLOOD CELLS: 0 /100 WBC
PLATELET # BLD: 452 THOU/MM3 (ref 130–400)
PMV BLD AUTO: 8.6 FL (ref 9.4–12.4)
RBC # BLD: 3.35 MILL/MM3 (ref 4.2–5.4)
SEG NEUTROPHILS: 75.4 %
SEGMENTED NEUTROPHILS ABSOLUTE COUNT: 12.4 THOU/MM3 (ref 1.8–7.7)
WBC # BLD: 16.4 THOU/MM3 (ref 4.8–10.8)

## 2019-12-27 PROCEDURE — 6370000000 HC RX 637 (ALT 250 FOR IP): Performed by: PHYSICIAN ASSISTANT

## 2019-12-27 PROCEDURE — 36415 COLL VENOUS BLD VENIPUNCTURE: CPT

## 2019-12-27 PROCEDURE — 85025 COMPLETE CBC W/AUTO DIFF WBC: CPT

## 2019-12-27 PROCEDURE — 1200000000 HC SEMI PRIVATE

## 2019-12-27 PROCEDURE — 94760 N-INVAS EAR/PLS OXIMETRY 1: CPT

## 2019-12-27 PROCEDURE — 2580000003 HC RX 258: Performed by: PHYSICIAN ASSISTANT

## 2019-12-27 PROCEDURE — 6360000002 HC RX W HCPCS: Performed by: PHYSICIAN ASSISTANT

## 2019-12-27 RX ADMIN — HYDROCODONE BITARTRATE AND ACETAMINOPHEN 2 TABLET: 5; 325 TABLET ORAL at 06:22

## 2019-12-27 RX ADMIN — LORAZEPAM 0.5 MG: 0.5 TABLET ORAL at 20:09

## 2019-12-27 RX ADMIN — HYDROCODONE BITARTRATE AND ACETAMINOPHEN 2 TABLET: 5; 325 TABLET ORAL at 15:41

## 2019-12-27 RX ADMIN — DOCUSATE SODIUM 100 MG: 100 CAPSULE, LIQUID FILLED ORAL at 20:09

## 2019-12-27 RX ADMIN — CYCLOBENZAPRINE 10 MG: 10 TABLET, FILM COATED ORAL at 20:09

## 2019-12-27 RX ADMIN — POLYETHYLENE GLYCOL 3350 17 G: 17 POWDER, FOR SOLUTION ORAL at 08:02

## 2019-12-27 RX ADMIN — CYCLOBENZAPRINE 10 MG: 10 TABLET, FILM COATED ORAL at 00:12

## 2019-12-27 RX ADMIN — GABAPENTIN 300 MG: 300 CAPSULE ORAL at 20:09

## 2019-12-27 RX ADMIN — DOCUSATE SODIUM 100 MG: 100 CAPSULE, LIQUID FILLED ORAL at 08:02

## 2019-12-27 RX ADMIN — CEFAZOLIN SODIUM 2 G: 10 INJECTION, POWDER, FOR SOLUTION INTRAVENOUS at 01:49

## 2019-12-27 RX ADMIN — SERTRALINE 100 MG: 100 TABLET, FILM COATED ORAL at 08:02

## 2019-12-27 RX ADMIN — CYCLOBENZAPRINE 10 MG: 10 TABLET, FILM COATED ORAL at 11:27

## 2019-12-27 RX ADMIN — Medication 10 ML: at 20:09

## 2019-12-27 RX ADMIN — HYDROCODONE BITARTRATE AND ACETAMINOPHEN 2 TABLET: 5; 325 TABLET ORAL at 20:09

## 2019-12-27 RX ADMIN — LABETALOL HYDROCHLORIDE 100 MG: 100 TABLET, FILM COATED ORAL at 08:02

## 2019-12-27 RX ADMIN — HYDROCODONE BITARTRATE AND ACETAMINOPHEN 2 TABLET: 5; 325 TABLET ORAL at 01:50

## 2019-12-27 RX ADMIN — HYDROCODONE BITARTRATE AND ACETAMINOPHEN 1 TABLET: 5; 325 TABLET ORAL at 11:27

## 2019-12-27 ASSESSMENT — PAIN - FUNCTIONAL ASSESSMENT
PAIN_FUNCTIONAL_ASSESSMENT: ACTIVITIES ARE NOT PREVENTED
PAIN_FUNCTIONAL_ASSESSMENT: PREVENTS OR INTERFERES SOME ACTIVE ACTIVITIES AND ADLS

## 2019-12-27 ASSESSMENT — PAIN SCALES - GENERAL
PAINLEVEL_OUTOF10: 7
PAINLEVEL_OUTOF10: 8
PAINLEVEL_OUTOF10: 6
PAINLEVEL_OUTOF10: 6
PAINLEVEL_OUTOF10: 5
PAINLEVEL_OUTOF10: 7
PAINLEVEL_OUTOF10: 7
PAINLEVEL_OUTOF10: 5
PAINLEVEL_OUTOF10: 8
PAINLEVEL_OUTOF10: 5
PAINLEVEL_OUTOF10: 5

## 2019-12-27 ASSESSMENT — PAIN DESCRIPTION - LOCATION
LOCATION: BACK

## 2019-12-27 ASSESSMENT — PAIN DESCRIPTION - PROGRESSION
CLINICAL_PROGRESSION: NOT CHANGED
CLINICAL_PROGRESSION: NOT CHANGED
CLINICAL_PROGRESSION: GRADUALLY WORSENING
CLINICAL_PROGRESSION: GRADUALLY IMPROVING
CLINICAL_PROGRESSION: NOT CHANGED
CLINICAL_PROGRESSION: GRADUALLY WORSENING
CLINICAL_PROGRESSION: GRADUALLY WORSENING
CLINICAL_PROGRESSION: NOT CHANGED
CLINICAL_PROGRESSION: NOT CHANGED

## 2019-12-27 ASSESSMENT — PAIN DESCRIPTION - FREQUENCY
FREQUENCY: CONTINUOUS

## 2019-12-27 ASSESSMENT — PAIN DESCRIPTION - DESCRIPTORS
DESCRIPTORS: ACHING
DESCRIPTORS: ACHING;THROBBING

## 2019-12-27 ASSESSMENT — PAIN DESCRIPTION - ORIENTATION
ORIENTATION: LOWER;MID

## 2019-12-27 ASSESSMENT — PAIN DESCRIPTION - ONSET
ONSET: ON-GOING

## 2019-12-27 ASSESSMENT — PAIN DESCRIPTION - PAIN TYPE
TYPE: ACUTE PAIN;SURGICAL PAIN
TYPE: SURGICAL PAIN
TYPE: ACUTE PAIN;SURGICAL PAIN
TYPE: SURGICAL PAIN

## 2019-12-27 NOTE — PROGRESS NOTES
Nutrition Assessment    Type and Reason for Visit: Reassess(PO Monitor)    Nutrition Recommendations:   *Recommend getting an admit weight on patient. *Continue current diet and ONS as ordered. *Consider a Multivitamin w/minerals daily to aid in wound healing. Nutrition Assessment: Pt improving from a nutritional standpoint AEB pt report of good appetite and intake when she was able to eat in between surgeries over the past few days and reports good acceptance of Ensure Compact TID. Remains at risk for further nutritional compromise r/t increased needs for healing process s/p multiple recent back surgeries and hx HTN. Nutrition recommendations/interventions as per above. Malnutrition Assessment:  · Malnutrition Status: Meets the criteria for moderate malnutrition  · Context: Acute illness or injury  · Findings of the 6 clinical characteristics of malnutrition (Minimum of 2 out of 6 clinical characteristics is required to make the diagnosis of moderate or severe Protein Calorie Malnutrition based on AND/ASPEN Guidelines):  1. Energy Intake-Less than or equal to 75% of estimated energy requirement, Greater than or equal to 7 days    2. Muscle Loss-Mild muscle mass loss, Temples (temporalis muscle), Clavicles (pectoralis and deltoids)    Nutrition Risk Level: Moderate    Nutrient Needs:  · Estimated Daily Total Kcal: 1740 (30/kgm based on 58kgm)  · Estimated Daily Protein (g): 87 grams (1.5/kgm based on 58kgm)    Nutrition Diagnosis:   · Problem: Moderate malnutrition, In context of acute illness or injury  · Etiology: related to Insufficient energy/nutrient consumption     Signs and symptoms:  as evidenced by Patient report of, Diet history of poor intake, Mild muscle loss    Objective Information:  · Nutrition-Focused Physical Findings: pt seen; pt denies N/V/D- last BM x2 on 12/24; s/p multiple OR's.  Rx includes: Colace, Glycolax, Zofran & Phenergan PRN  · Wound Type: Surgical Wound(L4-L5 lumbar laminectomy and fusion 12/13 IOS; 12/21 dura leak repair; 12/26 s/p durotomy repair on 12/26)  · Current Nutrition Therapies:  · Oral Diet Orders: General   · Oral Diet intake: 51-75%, %  · Oral Nutrition Supplement (ONS) Orders: Low Volume Supplement, Other Oral Supplement (See Comment)(Compact TID; activia and hot beverage TID for nutrition ileus prevention)  · ONS intake: %(pt report drinking x2-3 Ensure Compact daily and Yogurt & Hot Beverage with meals)  · Anthropometric Measures:  · Ht: 5' 5\" (165.1 cm)   · Current Body Wt: 127 lb (57.6 kg)(12/19 stated per pt.  - bedscale reads 147.5# - ? accuracy)  · Admission Body Wt: 127 lb (57.6 kg)(12/19 stated)  · Usual Body Wt: 127 lb (57.6 kg)(per pt.)  · % Weight Change: no EMR weights to verify  · Ideal Body Wt: 125 lb (56.7 kg)   · BMI Classification: BMI 18.5 - 24.9 Normal Weight(21.2)    Nutrition Interventions:   Continue current diet, Continue current ONS, Vitamin Supplement  Continued Inpatient Monitoring, Education Initiated, Coordination of Care(Encouraged lean protein sources with meals and ONS to aid in healing.)    Nutrition Evaluation:   · Evaluation: Progressing toward goals   · Goals: consume greater than 75% meals during LOS    · Monitoring: Nutrition Progression, Meal Intake, Supplement Intake, Diet Tolerance, Skin Integrity, Wound Healing, Weight, Pertinent Labs, Monitor Bowel Function    Electronically signed by Mili Conde RD, LD on 12/27/19 at 1:37 PM    Contact Number: [phone]

## 2019-12-27 NOTE — PLAN OF CARE
Problem: Nutrition  Goal: Optimal nutrition therapy  12/27/2019 1341 by Kirsten Pool RD, LD  Outcome: Ongoing   Nutrition Problem:  Moderate malnutrition, In context of acute illness or injury  Intervention: Food and/or Nutrient Delivery: Continue current diet, Continue current ONS, Vitamin Supplement  Nutritional Goals: consume greater than 75% meals during LOS

## 2019-12-27 NOTE — CARE COORDINATION
12/27/19, 3:54 PM    DISCHARGE ON GOING 5360 Incentient day: 8  Location: [facility]/015- Reason for admit: Spinal stenosis [M48.00]   Procedure:   (12/13 Initial L3-L5 sback surgery by Dr Kimmie Burrell)  12/21  Dura repair  12/26  dura leak exploration/repair , no drain by Dr. Heather Amaro of Care: no headache, no more than 30 degrees HOB today, out of bed Sat with PT, pain control, IV stopped, tolerating diet. Second dural leak found intraoperatively yesterday, repaired well. No drain. Barriers to Discharge: none  PCP: Cheryle Rocks, DO  Readmission Risk Score: 11%  Patient Goals/Plan/Treatment Preferences: plans home when ortho discharges. No drain in.

## 2019-12-27 NOTE — OP NOTE
linear 2 to 3 mm size durotomy with overlying  bone, which raised our suspicion that this was an erosion of the thecal  sac, caused by overlying bone from previous laminectomy. The bone was  not significantly sharp, but was somewhat protruding into the canal and  very likely by its location caused its erosion. With identification of  the CSF output, we were able to then place a 4-0 Nurolon suture in a  figure-of-eight pattern into this right side lateral recess portion of  the thecal sac, which closed down this defect very well. With repeat  Valsalva maneuver, there was a trickle of CSF coming from the suture  entry site due to the thin nature of the dura at this location, but the  repair blocked CSF output from this location. I explored the remainder of  the canal within the recess, another area was exposed to bone and deep  to this along the canal and we could find out the area of CSF output. The initial repair was well made and well-retained. We then elected to  irrigate thoroughly with use of Bacitracin and saline solution. Once  the irrigation was complete, we then applied the Fibrillar dural  sealant, mixing this with DuraSeal to cover the thecal sac and recess of  the canal bilateral.  The repair site was also covered. We then backed  out the retractors and proceeded to close. A layer of closure was  complete with use of suture, followed by nylon closure and then the  fascial layer was complete, and we applied sterile dressings. A drain  was not used to minimize extradural negative pressure along the repair  site and minimize any risk of recurrence of CSF leak. Dressings were  applied having wound closure complete, we then took the patient back to  recovery room post extubation without complication or difficulty. My  first assistant was Kimmie Reyes, [facility], as well.     Kimmie Reyes, PAC, assisted throughout the procedure with positioning,  draping, retraction, wound closure, dressing, and splint

## 2019-12-27 NOTE — PROGRESS NOTES
[address] [facility] PAC notified of need to change patient dressing x 2 today.  She will evaluate in am.

## 2019-12-27 NOTE — PLAN OF CARE
Problem: Falls - Risk of:  Goal: Will remain free from falls  Description  Will remain free from falls  Outcome: Ongoing  Note:   No falls this shift. Pt using call light appropriately to call for assistance with ambulation to the bathroom and to chair. Pt is also compliant with use of non-skid slippers. Pt reports understanding of fall prevention when discussed. Goal: Absence of physical injury  Description  Absence of physical injury  Outcome: Ongoing  Note:   No physical injury this shift. Will continue to monitor. Problem: Risk for Impaired Skin Integrity  Goal: Tissue integrity - skin and mucous membranes  Description  Structural intactness and normal physiological function of skin and  mucous membranes. Outcome: Ongoing  Note:   Pt's back surgical incision healing. Dressing is dry and intact and not due to be changed today. No other skin impairments noted. Pt understands the importance of frequent repositioning in order to prevent any skin breakdown. Problem: Pain:  Goal: Pain level will decrease  Description  Pain level will decrease  Outcome: Ongoing  Note:   Pt report pain at 4-8 on scale. Pt states oral medication helping to achieve pain goal of a 0 on scale. Goal: Control of acute pain  Description  Control of acute pain  Outcome: Ongoing  Note:   Pt report pain at 4-8 on scale. Pt states oral medication helping to achieve pain goal of a 0 on scale. Problem: SAFETY  Goal: Free from accidental physical injury  Outcome: Ongoing  Note:   No accidental physical injury this shift. Will continue to monitor. Goal: Free from intentional harm  Outcome: Ongoing  Note:   No intentional harm this shift. Will continue to monitor. Problem: DAILY CARE  Goal: Daily care needs are met  Outcome: Ongoing  Note:   Patient able to perform daily cares with moderate assistance. Patient uses call light when assistance is needed.       Problem: PAIN  Goal: Patient's pain/discomfort is manageable  Outcome: Ongoing  Note:   Pt report pain at 4-8 on scale. Pt states oral medication helping to achieve pain goal of a 0 on scale. Problem: Nausea/Vomiting:  Goal: Absence of nausea/vomiting  Description  Absence of nausea/vomiting  Outcome: Ongoing  Note:   No n/v this shift. Goal: Able to drink  Description  Able to drink  Outcome: Ongoing  Note:   Pt able to drink  Goal: Able to eat  Description  Able to eat  Outcome: Ongoing  Note:   Pt with a better appetite this shift. Goal: Ability to achieve adequate nutritional intake will improve  Description  Ability to achieve adequate nutritional intake will improve  Outcome: Ongoing  Note:   Pt with a better appetite this shift. Problem: DISCHARGE BARRIERS  Goal: Patient's continuum of care needs are met  Outcome: Ongoing  Note:   Pt plans home with [facility] at discharge. Care manager and social working helping with discharge needs. Problem: Nutrition  Goal: Optimal nutrition therapy  Outcome: Ongoing  Note:   Pt with a better appetite this shift. Care plan reviewed with patient. Patient verbalizes understanding of the plan of care and contributes to goal setting.

## 2019-12-28 LAB
BASOPHILS # BLD: 0.5 %
BASOPHILS ABSOLUTE: 0.1 THOU/MM3 (ref 0–0.1)
EOSINOPHIL # BLD: 3.2 %
EOSINOPHILS ABSOLUTE: 0.3 THOU/MM3 (ref 0–0.4)
ERYTHROCYTE [DISTWIDTH] IN BLOOD BY AUTOMATED COUNT: 13.7 % (ref 11.5–14.5)
ERYTHROCYTE [DISTWIDTH] IN BLOOD BY AUTOMATED COUNT: 46.8 FL (ref 35–45)
HCT VFR BLD CALC: 34.4 % (ref 37–47)
HEMOGLOBIN: 10.8 GM/DL (ref 12–16)
IMMATURE GRANS (ABS): 0.08 THOU/MM3 (ref 0–0.07)
IMMATURE GRANULOCYTES: 0.8 %
LYMPHOCYTES # BLD: 26.9 %
LYMPHOCYTES ABSOLUTE: 2.8 THOU/MM3 (ref 1–4.8)
MCH RBC QN AUTO: 29.6 PG (ref 26–33)
MCHC RBC AUTO-ENTMCNC: 31.4 GM/DL (ref 32.2–35.5)
MCV RBC AUTO: 94.2 FL (ref 81–99)
MONOCYTES # BLD: 5.7 %
MONOCYTES ABSOLUTE: 0.6 THOU/MM3 (ref 0.4–1.3)
NUCLEATED RED BLOOD CELLS: 0 /100 WBC
PLATELET # BLD: 423 THOU/MM3 (ref 130–400)
PMV BLD AUTO: 8.5 FL (ref 9.4–12.4)
RBC # BLD: 3.65 MILL/MM3 (ref 4.2–5.4)
SEG NEUTROPHILS: 62.9 %
SEGMENTED NEUTROPHILS ABSOLUTE COUNT: 6.5 THOU/MM3 (ref 1.8–7.7)
WBC # BLD: 10.3 THOU/MM3 (ref 4.8–10.8)

## 2019-12-28 PROCEDURE — 97162 PT EVAL MOD COMPLEX 30 MIN: CPT

## 2019-12-28 PROCEDURE — 97110 THERAPEUTIC EXERCISES: CPT

## 2019-12-28 PROCEDURE — 6370000000 HC RX 637 (ALT 250 FOR IP): Performed by: PHYSICIAN ASSISTANT

## 2019-12-28 PROCEDURE — 85025 COMPLETE CBC W/AUTO DIFF WBC: CPT

## 2019-12-28 PROCEDURE — 1200000000 HC SEMI PRIVATE

## 2019-12-28 PROCEDURE — 36415 COLL VENOUS BLD VENIPUNCTURE: CPT

## 2019-12-28 PROCEDURE — 2580000003 HC RX 258: Performed by: PHYSICIAN ASSISTANT

## 2019-12-28 PROCEDURE — 6360000002 HC RX W HCPCS: Performed by: PHYSICIAN ASSISTANT

## 2019-12-28 RX ADMIN — Medication 10 ML: at 08:50

## 2019-12-28 RX ADMIN — HYDROCODONE BITARTRATE AND ACETAMINOPHEN 1 TABLET: 5; 325 TABLET ORAL at 22:03

## 2019-12-28 RX ADMIN — HYDROCODONE BITARTRATE AND ACETAMINOPHEN 2 TABLET: 5; 325 TABLET ORAL at 00:45

## 2019-12-28 RX ADMIN — DOCUSATE SODIUM 100 MG: 100 CAPSULE, LIQUID FILLED ORAL at 20:02

## 2019-12-28 RX ADMIN — LABETALOL HYDROCHLORIDE 100 MG: 100 TABLET, FILM COATED ORAL at 08:49

## 2019-12-28 RX ADMIN — LORAZEPAM 0.5 MG: 0.5 TABLET ORAL at 20:02

## 2019-12-28 RX ADMIN — DOCUSATE SODIUM 100 MG: 100 CAPSULE, LIQUID FILLED ORAL at 08:49

## 2019-12-28 RX ADMIN — POLYETHYLENE GLYCOL 3350 17 G: 17 POWDER, FOR SOLUTION ORAL at 08:49

## 2019-12-28 RX ADMIN — HYDROCODONE BITARTRATE AND ACETAMINOPHEN 2 TABLET: 5; 325 TABLET ORAL at 17:48

## 2019-12-28 RX ADMIN — GABAPENTIN 300 MG: 300 CAPSULE ORAL at 20:02

## 2019-12-28 RX ADMIN — CYCLOBENZAPRINE 10 MG: 10 TABLET, FILM COATED ORAL at 16:34

## 2019-12-28 RX ADMIN — Medication 10 ML: at 20:02

## 2019-12-28 RX ADMIN — BISACODYL 10 MG: 10 SUPPOSITORY RECTAL at 08:50

## 2019-12-28 RX ADMIN — HYDROCODONE BITARTRATE AND ACETAMINOPHEN 1 TABLET: 5; 325 TABLET ORAL at 08:49

## 2019-12-28 RX ADMIN — ONDANSETRON 4 MG: 2 INJECTION INTRAMUSCULAR; INTRAVENOUS at 22:03

## 2019-12-28 RX ADMIN — SERTRALINE 100 MG: 100 TABLET, FILM COATED ORAL at 08:49

## 2019-12-28 RX ADMIN — CYCLOBENZAPRINE 10 MG: 10 TABLET, FILM COATED ORAL at 05:48

## 2019-12-28 ASSESSMENT — PAIN DESCRIPTION - ONSET
ONSET: ON-GOING
ONSET: ON-GOING
ONSET: PROGRESSIVE
ONSET: ON-GOING

## 2019-12-28 ASSESSMENT — PAIN SCALES - GENERAL
PAINLEVEL_OUTOF10: 5
PAINLEVEL_OUTOF10: 4
PAINLEVEL_OUTOF10: 7
PAINLEVEL_OUTOF10: 4
PAINLEVEL_OUTOF10: 4
PAINLEVEL_OUTOF10: 7
PAINLEVEL_OUTOF10: 3
PAINLEVEL_OUTOF10: 7
PAINLEVEL_OUTOF10: 10
PAINLEVEL_OUTOF10: 7
PAINLEVEL_OUTOF10: 5
PAINLEVEL_OUTOF10: 6

## 2019-12-28 ASSESSMENT — PAIN DESCRIPTION - LOCATION
LOCATION: BACK

## 2019-12-28 ASSESSMENT — PAIN - FUNCTIONAL ASSESSMENT
PAIN_FUNCTIONAL_ASSESSMENT: PREVENTS OR INTERFERES SOME ACTIVE ACTIVITIES AND ADLS

## 2019-12-28 ASSESSMENT — PAIN DESCRIPTION - DESCRIPTORS
DESCRIPTORS: ACHING
DESCRIPTORS: BURNING;ACHING
DESCRIPTORS: BURNING;ACHING
DESCRIPTORS: ACHING

## 2019-12-28 ASSESSMENT — PAIN DESCRIPTION - PAIN TYPE
TYPE: ACUTE PAIN
TYPE: ACUTE PAIN;SURGICAL PAIN

## 2019-12-28 ASSESSMENT — PAIN DESCRIPTION - PROGRESSION
CLINICAL_PROGRESSION: GRADUALLY WORSENING
CLINICAL_PROGRESSION: GRADUALLY IMPROVING
CLINICAL_PROGRESSION: NOT CHANGED
CLINICAL_PROGRESSION: GRADUALLY WORSENING
CLINICAL_PROGRESSION: GRADUALLY IMPROVING

## 2019-12-28 ASSESSMENT — PAIN DESCRIPTION - FREQUENCY
FREQUENCY: CONTINUOUS

## 2019-12-28 ASSESSMENT — PAIN DESCRIPTION - ORIENTATION
ORIENTATION: MID;LOWER
ORIENTATION: LOWER;MID
ORIENTATION: MID;LOWER
ORIENTATION: MID;LOWER

## 2019-12-28 NOTE — PROGRESS NOTES
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       OBJECTIVE:  Range of Motion:  Bilateral Lower Extremity: WFL    Strength:  Bilateral Lower Extremity: Impaired - grossly 4/5    Balance:  Static Standing Balance: Stand By Assistance  Dynamic Standing Balance: Contact Guard Assistance    Bed Mobility:  Supine to Sit: Stand By Assistance  Sit to Supine: Stand By Assistance   **Log roll technique    Transfers:  Sit to Stand: [address], several trials from EOB and toilet  Stand to Sit:Contact [employer] Assistance    Ambulation:  Contact Guard Assistance  Distance: 20 feet x 2, 25 feet  Surface: Level Tile  Device:Rolling Walker  Gait Deviations:  Slow Emily, Decreased Step Length Bilaterally and Decreased Gait Speed  **Slow and guarded pace    Exercise:  Patient was guided in 1 set(s) 10 reps of exercise to both lower extremities. Ankle pumps, Quad sets, Heelslides and Hip abduction/adduction. Exercises were completed for increased independence with functional mobility. Functional Outcome Measures: Not completed       ASSESSMENT:  Activity Tolerance:  Patient tolerance of  treatment: good. Treatment Initiated: Treatment and education initiated within context of evaluation. Evaluation time included review of current medical information, gathering information related to past medical, social and functional history, completion of standardized testing, formal and informal observation of tasks, assessment of data and development of plan of care and goals. Treatment time included skilled education and facilitation of tasks to increase safety and independence with functional mobility for improved independence and quality of life.   See above exercises, education on mobility while in the hospital.    Assessment: Body structures, Functions, Activity limitations: Decreased functional mobility , Decreased strength, Decreased endurance, Increased pain  Assessment: Pt tolerates session well, limited by pain and generalized weakness, has been in bed for over a week. Pt denies HA throughout session. PT to continue to progress strength and functional mobility. Prognosis: Excellent    REQUIRES PT FOLLOW UP: Yes    Discharge Recommendations:  Discharge Recommendations: Home with assist PRN    Patient Education:  PT Education: Goals, PT Role, Plan of Care    Equipment Recommendations:  Equipment Needed: No    Plan:  Times per week: 7 X O  Current Treatment Recommendations: Strengthening, Functional Mobility Training, Transfer Training, Gait Training, Stair training, Patient/Caregiver Education & Training, Safety Education & Training, Home Exercise Program    Goals:  Patient goals : to go home  Short term goals  Time Frame for Short term goals: by discharge  Short term goal 1: Pt to transfer supine <--> sit mod I to enable pt to get in/out of bed. Short term goal 2: Pt to transfer sit <--> stand mod I for increased functional mobility. Short term goal 3: Pt to ambulate >500 feet with RW Supervision for household ambulation. Short term goal 4: Pt to ascend/descend 3 steps without HR SBA for home entry. Short term goal 5: Pt to perform car transfer SBA to enable pt to get in/out of the car. Long term goals  Time Frame for Long term goals : NA due to short length of stay. Following session, patient left in safe position with all fall risk precautions in place.

## 2019-12-28 NOTE — PLAN OF CARE
Problem: Falls - Risk of:  Goal: Will remain free from falls  Description  Will remain free from falls  12/28/2019 0447 by Katharine Parnell RN  Outcome: Ongoing  Note:   No falls this shift. Pt using call light appropriately to call for assistance with ambulation to the bathroom and to chair. Pt is also compliant with use of non-skid slippers. Pt reports understanding of fall prevention when discussed. Goal: Absence of physical injury  Description  Absence of physical injury  12/28/2019 0447 by Katharine Parnell RN  Outcome: Ongoing  Note:   No physical injury this shift. Will continue to monitor. Problem: Risk for Impaired Skin Integrity  Goal: Tissue integrity - skin and mucous membranes  Description  Structural intactness and normal physiological function of skin and  mucous membranes. 12/28/2019 0447 by Katharine Parnell RN  Outcome: Ongoing  Note:   Pt's back surgical incision healing. Dressing is dry and intact and not due to be changed today. No other skin impairments noted. Pt understands the importance of frequent repositioning in order to prevent any skin breakdown. Problem: Pain:  Goal: Pain level will decrease  Description  Pain level will decrease  12/28/2019 0447 by Katharine Parnell RN  Outcome: Ongoing  Note:   Pt report pain at 4-8 on scale. Pt states oral medication helping to achieve pain goal of a 0 on scale. Problem: SAFETY  Goal: Free from accidental physical injury  12/28/2019 0447 by Katharine Parnell RN  Outcome: Ongoing  Note:   No accidental physical injury this shift. Will continue to monitor. Problem: DAILY CARE  Goal: Daily care needs are met  12/28/2019 0447 by Katharine Parnell RN  Outcome: Ongoing  Note:   Patient able to perform daily cares with moderate assistance. Patient uses call light when assistance is needed.       Problem: PAIN  Goal: Patient's pain/discomfort is manageable  12/28/2019 0447 by Katharine Parnell RN  Outcome: Ongoing  Note:   Pt report pain at 4-8 on scale. Pt states oral medication helping to achieve pain goal of a 0 on scale. Problem: Nausea/Vomiting:  Goal: Absence of nausea/vomiting  Description  Absence of nausea/vomiting  12/28/2019 0447 by Connie Spann RN  Outcome: Ongoing  Note:   No n/v this shift. Goal: Able to drink  Description  Able to drink  12/28/2019 0447 by Connie Spann RN  Outcome: Ongoing  Note:   Pt able to drink    Goal: Able to eat  Description  Able to eat  12/28/2019 0447 by Connie Spann RN  Outcome: Ongoing  Note:   Pt with a better appetite this shift. Goal: Ability to achieve adequate nutritional intake will improve  Description  Ability to achieve adequate nutritional intake will improve  12/28/2019 0447 by Connie Spann RN  Outcome: Ongoing  Note:   Pt with a better appetite this shift. Problem: DISCHARGE BARRIERS  Goal: Patient's continuum of care needs are met  12/28/2019 0447 by Connie Spann RN  Outcome: Ongoing  Note:   Pt plans home at discharge. Care manager and social working helping with discharge needs. Problem: Nutrition  Goal: Optimal nutrition therapy  12/28/2019 0447 by Connie Spann RN  Outcome: Ongoing  Note:   Pt with a better appetite this shift. Care plan reviewed with patient. Patient verbalizes understanding of the plan of care and contributes to goal setting.

## 2019-12-29 VITALS
OXYGEN SATURATION: 96 % | DIASTOLIC BLOOD PRESSURE: 55 MMHG | BODY MASS INDEX: 21.16 KG/M2 | RESPIRATION RATE: 16 BRPM | TEMPERATURE: 98.2 F | SYSTOLIC BLOOD PRESSURE: 119 MMHG | WEIGHT: 127 LBS | HEIGHT: 65 IN | HEART RATE: 104 BPM

## 2019-12-29 LAB
AMORPHOUS: ABNORMAL
BACTERIA: ABNORMAL /HPF
BASOPHILS # BLD: 0.5 %
BASOPHILS ABSOLUTE: 0.1 THOU/MM3 (ref 0–0.1)
BILIRUBIN URINE: NEGATIVE
BLOOD, URINE: ABNORMAL
CASTS 2: ABNORMAL /LPF
CASTS UA: ABNORMAL /LPF
CHARACTER, URINE: ABNORMAL
COLOR: YELLOW
CRYSTALS, UA: ABNORMAL
EOSINOPHIL # BLD: 3.9 %
EOSINOPHILS ABSOLUTE: 0.4 THOU/MM3 (ref 0–0.4)
EPITHELIAL CELLS, UA: ABNORMAL /HPF
ERYTHROCYTE [DISTWIDTH] IN BLOOD BY AUTOMATED COUNT: 13.7 % (ref 11.5–14.5)
ERYTHROCYTE [DISTWIDTH] IN BLOOD BY AUTOMATED COUNT: 47.7 FL (ref 35–45)
GLUCOSE URINE: NEGATIVE MG/DL
HCT VFR BLD CALC: 37.3 % (ref 37–47)
HEMOGLOBIN: 11.5 GM/DL (ref 12–16)
IMMATURE GRANS (ABS): 0.11 THOU/MM3 (ref 0–0.07)
IMMATURE GRANULOCYTES: 1.1 %
KETONES, URINE: NEGATIVE
LEUKOCYTE ESTERASE, URINE: ABNORMAL
LYMPHOCYTES # BLD: 22.3 %
LYMPHOCYTES ABSOLUTE: 2.2 THOU/MM3 (ref 1–4.8)
MCH RBC QN AUTO: 29.5 PG (ref 26–33)
MCHC RBC AUTO-ENTMCNC: 30.8 GM/DL (ref 32.2–35.5)
MCV RBC AUTO: 95.6 FL (ref 81–99)
MISCELLANEOUS 2: ABNORMAL
MONOCYTES # BLD: 7.7 %
MONOCYTES ABSOLUTE: 0.8 THOU/MM3 (ref 0.4–1.3)
NITRITE, URINE: POSITIVE
NUCLEATED RED BLOOD CELLS: 0 /100 WBC
PH UA: 8.5 (ref 5–9)
PLATELET # BLD: 464 THOU/MM3 (ref 130–400)
PMV BLD AUTO: 8.5 FL (ref 9.4–12.4)
PROTEIN UA: 30
RBC # BLD: 3.9 MILL/MM3 (ref 4.2–5.4)
RBC URINE: ABNORMAL /HPF
RENAL EPITHELIAL, UA: ABNORMAL
SEG NEUTROPHILS: 64.5 %
SEGMENTED NEUTROPHILS ABSOLUTE COUNT: 6.5 THOU/MM3 (ref 1.8–7.7)
SPECIFIC GRAVITY, URINE: 1.01 (ref 1–1.03)
UROBILINOGEN, URINE: 1 EU/DL (ref 0–1)
WBC # BLD: 10 THOU/MM3 (ref 4.8–10.8)
WBC UA: ABNORMAL /HPF
YEAST: ABNORMAL

## 2019-12-29 PROCEDURE — 87077 CULTURE AEROBIC IDENTIFY: CPT

## 2019-12-29 PROCEDURE — 85025 COMPLETE CBC W/AUTO DIFF WBC: CPT

## 2019-12-29 PROCEDURE — 87086 URINE CULTURE/COLONY COUNT: CPT

## 2019-12-29 PROCEDURE — 6370000000 HC RX 637 (ALT 250 FOR IP): Performed by: PHYSICIAN ASSISTANT

## 2019-12-29 PROCEDURE — 81001 URINALYSIS AUTO W/SCOPE: CPT

## 2019-12-29 PROCEDURE — 97165 OT EVAL LOW COMPLEX 30 MIN: CPT

## 2019-12-29 PROCEDURE — 87186 SC STD MICRODIL/AGAR DIL: CPT

## 2019-12-29 PROCEDURE — 36415 COLL VENOUS BLD VENIPUNCTURE: CPT

## 2019-12-29 PROCEDURE — 97116 GAIT TRAINING THERAPY: CPT

## 2019-12-29 PROCEDURE — 97535 SELF CARE MNGMENT TRAINING: CPT

## 2019-12-29 PROCEDURE — 2580000003 HC RX 258: Performed by: PHYSICIAN ASSISTANT

## 2019-12-29 RX ORDER — HYDROCODONE BITARTRATE AND ACETAMINOPHEN 5; 325 MG/1; MG/1
1 TABLET ORAL EVERY 4 HOURS PRN
Qty: 42 TABLET | Refills: 0 | Status: SHIPPED | OUTPATIENT
Start: 2019-12-29 | End: 2020-01-05

## 2019-12-29 RX ORDER — CYCLOBENZAPRINE HCL 10 MG
10 TABLET ORAL 3 TIMES DAILY PRN
Qty: 50 TABLET | Refills: 0 | Status: SHIPPED | OUTPATIENT
Start: 2019-12-29 | End: 2020-01-08

## 2019-12-29 RX ADMIN — CYCLOBENZAPRINE 10 MG: 10 TABLET, FILM COATED ORAL at 01:09

## 2019-12-29 RX ADMIN — DOCUSATE SODIUM 100 MG: 100 CAPSULE, LIQUID FILLED ORAL at 10:02

## 2019-12-29 RX ADMIN — Medication 10 ML: at 10:02

## 2019-12-29 RX ADMIN — LABETALOL HYDROCHLORIDE 100 MG: 100 TABLET, FILM COATED ORAL at 10:02

## 2019-12-29 RX ADMIN — HYDROCODONE BITARTRATE AND ACETAMINOPHEN 1 TABLET: 5; 325 TABLET ORAL at 10:02

## 2019-12-29 RX ADMIN — POLYETHYLENE GLYCOL 3350 17 G: 17 POWDER, FOR SOLUTION ORAL at 10:02

## 2019-12-29 RX ADMIN — SERTRALINE 100 MG: 100 TABLET, FILM COATED ORAL at 10:02

## 2019-12-29 ASSESSMENT — PAIN DESCRIPTION - ONSET
ONSET: ON-GOING
ONSET: ON-GOING

## 2019-12-29 ASSESSMENT — PAIN SCALES - GENERAL
PAINLEVEL_OUTOF10: 4
PAINLEVEL_OUTOF10: 7
PAINLEVEL_OUTOF10: 4
PAINLEVEL_OUTOF10: 7
PAINLEVEL_OUTOF10: 6

## 2019-12-29 ASSESSMENT — PAIN DESCRIPTION - PROGRESSION
CLINICAL_PROGRESSION: GRADUALLY IMPROVING
CLINICAL_PROGRESSION: GRADUALLY IMPROVING

## 2019-12-29 ASSESSMENT — PAIN DESCRIPTION - ORIENTATION
ORIENTATION: LOWER;MID
ORIENTATION: LOWER;MID

## 2019-12-29 ASSESSMENT — PAIN DESCRIPTION - DESCRIPTORS
DESCRIPTORS: ACHING;BURNING
DESCRIPTORS: ACHING;BURNING

## 2019-12-29 ASSESSMENT — PAIN DESCRIPTION - PAIN TYPE
TYPE: SURGICAL PAIN
TYPE: SURGICAL PAIN

## 2019-12-29 ASSESSMENT — PAIN DESCRIPTION - FREQUENCY
FREQUENCY: CONTINUOUS
FREQUENCY: CONTINUOUS

## 2019-12-29 ASSESSMENT — PAIN DESCRIPTION - LOCATION
LOCATION: BACK
LOCATION: BACK

## 2019-12-29 NOTE — PROGRESS NOTES
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 She did her own self care and light homemaking. Cognition/Orientation:  Overall Orientation Status: Within Normal Limits  Overall Cognitive Status: WFL    ADL's:  Grooming: Supervision(brushing her teeth at the sink)  UE Dressing: Supervision(donning and doffing a pullover top)  LE Dressing: Supervision(donning/doffing pajama bottoms; donning/doffing slippers)  Additional Comments: Pt could don/doff her lumbar corset when it was given to her. Vision - Basic Assessment  Prior Vision: Wears glasses all the time    Functional Mobility:  Bed mobility  Supine to Sit: Supervision(sidelying to sit using the bedrail)  Sit to Supine: Supervision(sit to sidelying)  Scooting: Supervision    Functional Mobility  Functional - Mobility Device: Rolling Walker  Activity: To/from bathroom  Assist Level: Supervision  Functional Mobility Comments: Even step noted with no LOB.       Balance:  Balance  Sitting Balance: Supervision  Standing Balance: Stand by assistance(Grooming at the sink or finishing her lower body dressing)  Standing Balance  Time: 1 minute; 30 seconds  Activity: finishing her self care    Transfers:  Sit to stand: Supervision(from the edge of bed)  Stand to sit: Supervision(to the edge of bed)       Upper Extremity Assessment:Hand Dominance: medical information, gathering information related to past medical, social and functional history, completion of standardized testing, formal and informal observation of tasks, assessment of data and development of plan of care and goals. Treatment time included skilled education and facilitation of tasks to increase safety and independence with ADL's for improved functional independence and quality of life. Discussed with pt back protection techniques. Encouraged pt to limit strain to her body by keeping things close to her and avoid reaching; avoid holding her dog; planning before doing any activities to avoid having to reach or bend over. Pt had asked about whether she would be able to wash her own hair. Suggested that she be very careful when reaching up to her head level. Recommended that she use 1 arm at a time while reaching up to wash the hair. Discharge Recommendations:  Home with assist PRN    Patient Education:  OT Education: OT Role, Precautions, IADL Safety  Patient Education: Strategies for being active while protecting her back; importance of drinking fluids and to keep moving to help assure she is staying regular with having BMs; how to wash her hair and avoid having strain in her lower back. Equipment Recommendations:  Equipment Needed: Yes  Other: Would benefit from a grabbar in the shower and a hand marianna shower. Plan:  Times per week: Not applicable  Plan Comment: Pt would be able to return home when medically stable. No further OT recommended at this time. Specific instructions for Next Treatment: Not applicable    Goals:  Patient goals : \"I hope to go home today. \" pt states. Short term goals  Time Frame for Short term goals: Not applicable  Long term goals  Time Frame for Long term goals : None secondary to short length of stay. Following session, patient left in safe position with all fall risk precautions in place.

## 2019-12-29 NOTE — PLAN OF CARE
Problem: Falls - Risk of:  Goal: Will remain free from falls  Description  Will remain free from falls  Outcome: Ongoing  Note:   Pt using call light appropriately to call for assistance with ambulation to the bathroom and to chair. Pt is also compliant with use of non-skid slippers. Pt reports understanding of fall prevention when discussed. Problem: Falls - Risk of:  Goal: Absence of physical injury  Description  Absence of physical injury  Outcome: Ongoing  Note:   Pt using call light appropriately to call for assistance with ambulation to the bathroom and to chair. Pt is also compliant with use of non-skid slippers. Pt reports understanding of fall prevention when discussed. Problem: Risk for Impaired Skin Integrity  Goal: Tissue integrity - skin and mucous membranes  Description  Structural intactness and normal physiological function of skin and  mucous membranes. Outcome: Ongoing  Note:   Pt's surgical incision healing. Dressing is dry and intact and changed today. No other skin impairments noted. Pt understands the importance of frequent repositioning in order to prevent any skin breakdown. Problem: Pain:  Goal: Pain level will decrease  Description  Pain level will decrease  Outcome: Ongoing  Note:   Pt report pain at 3-8 on scale. Pt states oral medication helping to achieve pain goal of a 5 on scale. Problem: SAFETY  Goal: Free from accidental physical injury  Outcome: Ongoing  Note:   Pt using call light appropriately to call for assistance with ambulation to the bathroom and to chair. Pt is also compliant with use of non-skid slippers. Pt reports understanding of fall prevention when discussed. Problem: DAILY CARE  Goal: Daily care needs are met  Outcome: Ongoing  Note:   Up with 1 assist, walker, and gait belt. Requires assistance to mobilize out of bed, chair and to bathroom. Independent once set up for meals and bathing.        Problem: PAIN  Goal: Patient's pain/discomfort

## 2019-12-29 NOTE — PROGRESS NOTES
[address] [address]  INPATIENT PHYSICAL THERAPY  DAILY NOTE  [facility] ORTHOPEDICS 7K - 7K-15/015-A    Time In: 0915  Time Out: 6641  Timed Code Treatment Minutes: 12 Minutes  Minutes: 12          Date: 2019  Patient Name: Benjamín Zheng,  Gender:  female        MRN: [id number]  : 1960  (64 y.o.)     Referring Practitioner: Carol Hickey PA-C  Diagnosis: Spinal stenosis  Additional Pertinent Hx: Consulted for headaches. Patient was directed admitted by orthopedics for nausea and vomiting with headache. Patient is POD 7 lumbar laminectomy. Patient has been having headaches and was evaluated in the ED a few days prior to admission. Patient denies fevers, chills. Patient has been vomiting. There is no diarrhea. Patient denies paresthesias, loss of control of bladder or bowel. Pt has had 2 surgeries for dura repair since admission, has been on bedrest since , ok for activity as tolerated . Prior Level of Function:  Lives With: Family(, son, daughter and grandkids)  Type of Home: House  Home Layout: One level  Home Access: Stairs to enter without rails  Entrance Stairs - Number of Steps: 3  Home Equipment: Rolling walker, Cane   Bathroom Shower/Tub: Tub/Shower unit, Shower chair without back  Bathroom Toilet: Standard  Bathroom Accessibility: Accessible    Receives Help From: Family  ADL Assistance: [facility]: Needs assistance  Homemaking Responsibilities: Yes  Ambulation Assistance: Independent  Transfer Assistance: Independent  Active : Yes  Additional Comments: Pt was occasionally using a cane. She did her own self care and light homemaking.       Restrictions/Precautions:  Restrictions/Precautions: Surgical Protocols, Fall Risk  Required Braces or Orthoses  Spinal: Lumbar Corset  Position Activity Restriction  Spinal Precautions: No Bending, No Lifting, No Twisting    SUBJECTIVE: RN approved session, states pt is going home today, pt is agreeable to PT. PAIN: 6/10:     OBJECTIVE:  Bed Mobility:  Supine to Sit: Modified Independent  Sit to Supine: Modified Independent     Transfers:  Sit to Stand: Modified Independent  Stand to Sit:Modified Independent    Ambulation:  Supervision  Distance: >350 feet  Surface: Level Tile  Device:Rolling Walker  Gait Deviations:  Slow Emily, Decreased Step Length Bilaterally and Decreased Gait Speed    Stairs:  Stand By Assistance  Number of Steps: 4  Height: 6\" step with Bilateral Handrails    Functional Outcome Measures: Not completed       ASSESSMENT:  Assessment: Patient progressing toward established goals. Verbal education on car transfer, pt verbalizes good understanding. Activity Tolerance:  Patient tolerance of  treatment: good. Equipment Recommendations:Equipment Needed: No  Discharge Recommendations:  Home with assist PRN    Plan: Times per week: NA- DC from PT 12/29    Patient Education  Patient Education: Gait, Stairs    Goals:  Patient goals : to go home today    Following session, patient left in safe position with all fall risk precautions in place.

## 2019-12-30 NOTE — CARE COORDINATION
12/30/19, 7:24 AM      Discharged 12/29 home with spouse. Patient goals/plan/ treatment preferences discussed by  and . Patient goals/plan/ treatment preferences reviewed with patient/ family. Patient/ family verbalize understanding of discharge plan and are in agreement with goal/plan/treatment preferences. Understanding was demonstrated using the teach back method. AVS provided by RN at time of discharge, which includes all necessary medical information pertaining to the patients current course of illness, treatment, post-discharge goals of care, and treatment preferences.

## 2019-12-31 LAB
ORGANISM: ABNORMAL
URINE CULTURE REFLEX: ABNORMAL

## 2020-01-01 NOTE — DISCHARGE SUMMARY
Orthopedic Spine Discharge Summary      Patient Identification:   Attila Forbes   : 1960  MRN: [id number]   Account: [de-identified]      Patient's PCP: Stephanie Bynum DO    Admit Date: 2019     Discharge Date: 19    Admitting Physician: Graciela Grady MD     Discharge Provider: Natty Mccarty PA-C , Dr. Binu Moreno    Discharge Diagnoses:  1. Status post lumbar spine L4-L5 laminectomy and fusion from previous      date of surgery of 2019 at [facility]. 2.  Durotomy and leakage of spinal fluid, requiring repair. 3.  Status post lumbar spine decompressive laminectomy and fusion of the  L4-L5 level from 2019, at [facility]. 4.  Status post repair of durotomy on 2019.  5.  Persistent cerebrospinal fluid leakage with suspicion of adjacent  location of CSF leak to the L4 level. The patient was seen and examined on day of discharge and this discharge summary is in conjunction with any daily progress note from day of discharge. Operation Performed:  1. Opening of wound exploration of lumbar spine  surgical site and identification of durotomy at right thecal sac level  of L4 with closure of right side posterior thecal sac durotomy. 2. Exploration of lumbar spine wound with identification of  secondary CSF leak, right side at the L4 pedicle level with CSF  leak coming from a 2 mm durotomy with overlying bone causing an erosion  of the right L4 thecal sac. Repair of the durotomy with 4-0 Nurolon  stitch in a figure-of-eight pattern. Hospital Course: The patient is 64 y.o. female, known to us s/p L4-5 laminectomy and fusion completed on 19 at [facility]. We were called by home health and told the patient was experiencing tremendous headaches with clear thin fluid in the hemovac drains. She was brought to the hospital and underwent a dural repair on 19. She continued to have severe headaches and clear fluid in the drains postoperatively.  As she did not improve

## 2020-09-22 ENCOUNTER — HOSPITAL ENCOUNTER (OUTPATIENT)
Age: 60
Discharge: HOME | End: 2020-09-22
Payer: COMMERCIAL

## 2020-09-22 PROCEDURE — U0003 INFECTIOUS AGENT DETECTION BY NUCLEIC ACID (DNA OR RNA); SEVERE ACUTE RESPIRATORY SYNDROME CORONAVIRUS 2 (SARS-COV-2) (CORONAVIRUS DISEASE [COVID-19]), AMPLIFIED PROBE TECHNIQUE, MAKING USE OF HIGH THROUGHPUT TECHNOLOGIES AS DESCRIBED BY CMS-2020-01-R: HCPCS

## 2020-09-24 LAB — SARS-COV-2: NOT DETECTED

## 2022-07-21 ENCOUNTER — HOSPITAL ENCOUNTER (OUTPATIENT)
Dept: GENERAL RADIOLOGY | Age: 62
Discharge: HOME | End: 2022-07-21
Payer: COMMERCIAL

## 2022-07-21 ENCOUNTER — HOSPITAL ENCOUNTER (OUTPATIENT)
Age: 62
Discharge: HOME | End: 2022-07-21
Payer: COMMERCIAL

## 2022-07-21 DIAGNOSIS — M47.812 CERVICAL SPONDYLOSIS WITHOUT MYELOPATHY: ICD-10-CM

## 2022-07-21 PROCEDURE — 72100 X-RAY EXAM L-S SPINE 2/3 VWS: CPT

## 2022-09-29 ENCOUNTER — HOSPITAL ENCOUNTER (OUTPATIENT)
Dept: INTERVENTIONAL RADIOLOGY/VASCULAR | Age: 62
Discharge: HOME | End: 2022-09-29
Payer: MEDICARE

## 2022-09-29 DIAGNOSIS — M53.3 SACROILIAC PAIN: ICD-10-CM

## 2022-09-29 PROCEDURE — 27096 INJECT SACROILIAC JOINT: CPT

## 2022-09-29 PROCEDURE — 6360000002 HC RX W HCPCS: Performed by: RADIOLOGY

## 2022-09-29 PROCEDURE — 6360000004 HC RX CONTRAST MEDICATION: Performed by: RADIOLOGY

## 2022-09-29 PROCEDURE — 2709999900 IR INJ SI JOINT W OR WO ARTHROGRAPHY

## 2022-09-29 PROCEDURE — 2500000003 HC RX 250 WO HCPCS: Performed by: RADIOLOGY

## 2022-09-29 RX ORDER — BUPIVACAINE HYDROCHLORIDE 2.5 MG/ML
5 INJECTION, SOLUTION EPIDURAL; INFILTRATION; INTRACAUDAL ONCE
Status: COMPLETED | OUTPATIENT
Start: 2022-09-29 | End: 2022-09-29

## 2022-09-29 RX ORDER — METHYLPREDNISOLONE ACETATE 80 MG/ML
80 INJECTION, SUSPENSION INTRA-ARTICULAR; INTRALESIONAL; INTRAMUSCULAR; SOFT TISSUE ONCE
Status: COMPLETED | OUTPATIENT
Start: 2022-09-29 | End: 2022-09-29

## 2022-09-29 RX ADMIN — METHYLPREDNISOLONE ACETATE 80 MG: 80 INJECTION, SUSPENSION INTRA-ARTICULAR; INTRALESIONAL; INTRAMUSCULAR; SOFT TISSUE at 10:31

## 2022-09-29 RX ADMIN — IOHEXOL 1 ML: 180 INJECTION INTRAVENOUS at 10:31

## 2022-09-29 RX ADMIN — BUPIVACAINE HYDROCHLORIDE 4 ML: 2.5 INJECTION, SOLUTION EPIDURAL; INFILTRATION; INTRACAUDAL; PERINEURAL at 10:31

## 2022-09-29 NOTE — OP NOTE
Department of Radiology  Post Procedure Progress Note      Pre-Procedure Diagnosis:  SacroIliitis     Procedure Performed:  Sacroiliac Block/Steroid injection procedure     Anesthesia: local     Findings: successful    Immediate Complications:  None    Estimated Blood Loss: minimal    SEE DICTATED PROCEDURE NOTE FOR COMPLETE DETAILS.       Kellie Forbes MD   9/29/2022 10:32 AM

## 2022-09-29 NOTE — H&P
Formulation and discussion of sedation / procedure plans, risks, benefits, side effects and alternatives with patient and/or responsible adult completed.     Electronically signed by Prerna Gentile MD on 9/29/22 at 10:32 AM EDT

## 2022-09-29 NOTE — PROGRESS NOTES
1023 Patient received in IR for left SI joint injection. Pt states her pain is in her left buttock and left hip. 1025 This procedure has been fully reviewed with the patient and written informed consent has been obtained. 1030 Procedure started with Dr. Maria Jones. 1032 Procedure completed; patient tolerated well. Band aid to lower back; no bleeding noted. [id number] Patient taken to main radiology and discharged in stable condition.

## 2023-07-13 RX ORDER — METHYLPREDNISOLONE ACETATE 80 MG/ML
80 INJECTION, SUSPENSION INTRA-ARTICULAR; INTRALESIONAL; INTRAMUSCULAR; SOFT TISSUE ONCE
OUTPATIENT
Start: 2023-07-13 | End: 2023-07-13

## 2023-07-13 RX ORDER — BUPIVACAINE HYDROCHLORIDE 2.5 MG/ML
5 INJECTION, SOLUTION EPIDURAL; INFILTRATION; INTRACAUDAL ONCE
OUTPATIENT
Start: 2023-07-13 | End: 2023-07-13

## 2023-07-20 ENCOUNTER — HOSPITAL ENCOUNTER (OUTPATIENT)
Dept: INTERVENTIONAL RADIOLOGY/VASCULAR | Age: 63
Discharge: HOME | End: 2023-07-20
Payer: MEDICARE

## 2023-07-20 DIAGNOSIS — R52 PAIN: ICD-10-CM

## 2023-07-20 PROCEDURE — 6360000004 HC RX CONTRAST MEDICATION: Performed by: RADIOLOGY

## 2023-07-20 PROCEDURE — 6360000002 HC RX W HCPCS: Performed by: RADIOLOGY

## 2023-07-20 PROCEDURE — 2709999900 IR INJ SI JOINT W FLUORO W OR WO ARTHROGRAPHY LEFT

## 2023-07-20 RX ORDER — DEXAMETHASONE SODIUM PHOSPHATE 4 MG/ML
4 INJECTION, SOLUTION INTRA-ARTICULAR; INTRALESIONAL; INTRAMUSCULAR; INTRAVENOUS; SOFT TISSUE ONCE
Status: COMPLETED | OUTPATIENT
Start: 2023-07-20 | End: 2023-07-20

## 2023-07-20 RX ADMIN — Medication 10 MG: at 14:46

## 2023-07-20 RX ADMIN — DEXAMETHASONE SODIUM PHOSPHATE 4 MG: 4 INJECTION, SOLUTION INTRAMUSCULAR; INTRAVENOUS at 14:47

## 2023-07-20 RX ADMIN — IOPAMIDOL 2 ML: 408 INJECTION, SOLUTION INTRATHECAL at 14:47

## 2023-07-20 ASSESSMENT — PAIN SCALES - GENERAL: PAINLEVEL_OUTOF10: 6

## 2023-07-20 ASSESSMENT — PAIN DESCRIPTION - ORIENTATION: ORIENTATION: LEFT

## 2023-07-20 ASSESSMENT — PAIN DESCRIPTION - DESCRIPTORS: DESCRIPTORS: ACHING;SHARP

## 2023-07-20 ASSESSMENT — PAIN DESCRIPTION - PAIN TYPE: TYPE: CHRONIC PAIN

## 2023-07-20 ASSESSMENT — PAIN DESCRIPTION - LOCATION: LOCATION: BACK;LEG;FOOT

## 2023-07-20 NOTE — PROGRESS NOTES
1430 Patient received in IR for Left SI steriod injection. 1432 This procedure has been fully reviewed with the patient and written informed consent has been obtained. 1440 Procedure started with . 1442 Procedure completed; patient tolerated well. Band aid to left lower back; no bleeding noted. 1444 Patient taken back to main radiology.

## 2023-07-20 NOTE — OP NOTE
Department of Radiology  Post Procedure Progress Note      Pre-Procedure Diagnosis:  SacroIliitis     Procedure Performed:  Sacroiliac Block/Steroid injection procedure     Anesthesia: local     Findings: successful    Immediate Complications:  None    Estimated Blood Loss: minimal    SEE DICTATED PROCEDURE NOTE FOR COMPLETE DETAILS.       Rosa Lomeli MD   7/20/2023 2:44 PM

## 2025-06-16 NOTE — OP NOTE
[employer] Savings Card:    [id number]   PCN: [id number] [id number]  Group: [id number]  ID:[id number]                     [address] [place] [address]                                OPERATIVE REPORT    PATIENT NAME: Joselo Mcfarlane                   :        1960  MED REC NO:   [id number]                           ROOM:       0015  ACCOUNT NO:   [de-identified]                           ADMIT DATE: 2019  PROVIDER:     Enid Bloch, M.D.    DATE OF PROCEDURE:  2019    PREOPERATIVE DIAGNOSES:  1.  Status post lumbar spine L4-L5 laminectomy and fusion from previous      date of surgery of 2019 at [address] 2.  Durotomy and leakage of spinal fluid, requiring repair. POSTOPERATIVE DIAGNOSES:  1.  Status post lumbar spine L4-L5 laminectomy and fusion from previous      date of surgery of 2019 at [address] 2.  Durotomy and leakage of spinal fluid, requiring repair. OPERATION PERFORMED:  Opening of wound exploration of lumbar spine  surgical site and identification of durotomy at right thecal sac level  of L4 with closure of right side posterior thecal sac durotomy. SURGEON:  Enid Bloch, MD    ASSISTANT:  Amna Chacko PA-C    ANESTHESIA:  General.    BLOOD LOSS:  Minimal.    DRAINS:  Hemovac. COMPLICATIONS:  Zero. INDICATIONS:  The patient is 64years of age, having significant  headache, nausea, and pain since this past Monday. On the date of  2019, she underwent surgical treatment and L4-L5 laminectomy and  fusion which was uneventful, doing very well postoperatively, going home  on Jonas 12/15/2019. Beginning later that day and then more severely  on Monday the , experienced onset of severe headache, nausea,  vomiting and significant trouble with comfort upright. She was  tremendously painful and at that point taking bedrest.  Home Health was  checking on her, the drain outputs were steadily remaining in the 116 mL  range per day and we were modifying activity and monitoring drain  output.   Because her symptoms failed to improve, we brought her to the  hospital because of the significant pain and a highest index of  suspicion for a CSF leak. She therefore came to the hospital.   Identification of the CSF fluid in the drain was made, and we suspected  that this durotomy will require repair and therefore we had her admitted  to the hospital and planned for operative intervention on today's date. Seeing the patient preoperatively, she had significant pain, headache,  and CSF in her drain; and I discussed with both her and her  my  suspicion is that she has sustained a CSF leak which was not identified  during surgery, but could have occurred on a weak area of the dura or  dural bleb that broke through. We therefore intended to identify the  area of leak, repair this, provide bedrest following surgery, comfort,  maintenance of nausea control, and also place a Guerrero catheter for her  bedrest.  All this was well understood prior to this operation on  today's date. DESCRIPTION OF PROCEDURE:  We brought the patient to the operating room,  where upon entrance, timeout was observed, anesthetic delivered, airway  secured, Guerrero catheter was also placed. She was then turned prone to a  Devaughn frame table for appropriate bodily padding. We then removed the  drains and the sutures along the line of previous incision, applied  sterile 10 x 10 sheeting and then used a soap scrub solution followed by  ChloraPrep solution for prepping of the lumbar wound. We placed our  sheath, Eligio Cao as well, marking the skin and injected 10 mL of 1%  Lidocaine with epinephrine to our line of incision. We opened the skin  and fascial layer, removing all of our previously placed sutures. Identification of a dural bleb was seen at the right posterior thecal  sac level of L4 which would then be reduced. I then used two  figure-of-eight 4-0 Nurolon sutures to repair this dural bleb which came  together very well.   With the repair of this dural bleb, we were then  able to use the Valsalva maneuver to confirm a closure and no further  area of leakage of CSF fluid. Once complete, we then applied the  Fibrillar and a DuraSeal agent on top of this for maintenance of  additional seal ability and was able to then place the Hemovac drain,  closed in layers after irrigation and finished with layered closure. We  placed sterile dressings and then took the patient back to recovery post  extubation, after she was turned prone and extubated. My first  assistant was also Arlene Valdez, physician assistant certified as well. Arlene Valdez, PAC, assisted throughout the procedure with positioning,  draping, retraction, wound closure, dressing, and splint application.         Megan Li M.D.    D: 12/21/2019 10:53:55       T: 12/21/2019 13:19:46     [id number]  Job#: [id number]     Doc#: [id number]    CC:

## 2025-07-24 ENCOUNTER — TRANSCRIBE ORDERS (OUTPATIENT)
Dept: ADMINISTRATIVE | Age: 65
End: 2025-07-24

## 2025-07-24 DIAGNOSIS — M54.16 LUMBAR RADICULOPATHY: Primary | ICD-10-CM

## 2025-08-06 ENCOUNTER — HOSPITAL ENCOUNTER (OUTPATIENT)
Dept: MRI IMAGING | Age: 65
Discharge: HOME | End: 2025-08-06
Payer: MEDICARE

## 2025-08-06 DIAGNOSIS — M54.16 LUMBAR RADICULOPATHY: ICD-10-CM

## 2025-08-06 PROCEDURE — 72148 MRI LUMBAR SPINE W/O DYE: CPT

## (undated) DEVICE — PREP SOL PVP IODINE 4%  4 OZ/BTL

## (undated) DEVICE — GLOVE EXAM SM L95IN FNGR THK35MIL PALM THK24MIL OFF WHT

## (undated) DEVICE — DURASEAL® EXACT SPINAL SEALANT SYSTEM 3ML 5 PACK: Brand: DURASEAL EXACT SPINAL SEALANT SYSTEM

## (undated) DEVICE — BASIC SINGLE BASIN BTC-LF: Brand: MEDLINE INDUSTRIES, INC.

## (undated) DEVICE — HEAD POSITIONER, SURGICAL: Brand: DEROYAL

## (undated) DEVICE — TUBING, SUCTION, 1/4" X 20', STRAIGHT: Brand: MEDLINE INDUSTRIES, INC.

## (undated) DEVICE — 4-PORT MANIFOLD: Brand: NEPTUNE 2

## (undated) DEVICE — STRIP,CLOSURE,WOUND,MEDI-STRIP,1/2X4: Brand: MEDLINE

## (undated) DEVICE — SOLUTION IV 1000ML 0.9% SOD CHL PH 5 INJ USP VIAFLX PLAS

## (undated) DEVICE — SUTURE PERMA-HAND 4-0 L18IN NONABSORBABLE BLK L13MM TF 1/2 M104T

## (undated) DEVICE — DRESSING TRNSPAR W5XL4.5IN FLM SHT SEMIPERMEABLE WIND

## (undated) DEVICE — E-Z CLEAN, NON-STICK, PTFE COATED, ELECTROSURGICAL BLADE ELECTRODE, 6.5 INCH (16.5 CM): Brand: MEGADYNE

## (undated) DEVICE — AGENT HEMSTAT W2XL4IN OXIDIZED REGENERATED CELOS ABSRB SFT

## (undated) DEVICE — GLOVE SURG SZ 9 THK91MIL LTX FREE SYN POLYISOPRENE ANTI

## (undated) DEVICE — CHLORAPREP 26ML ORANGE

## (undated) DEVICE — PAD,O.R.,TABLE,CONV FOAM,2X20X72: Brand: MEDLINE

## (undated) DEVICE — Z DUP USE 2257490 ADHESIVE SKIN CLSRE 036ML TPCL 2CTL CNCRLTE HIGH VSCSTY DRMB

## (undated) DEVICE — GLOVE ORANGE PI 7   MSG9070

## (undated) DEVICE — CODMAN® SURGICAL PATTIES 1" X 1" (2.54CM X 2.54CM): Brand: CODMAN®

## (undated) DEVICE — DRAIN SURG 10FR PVC TB W/ TRCR MID PERF NO RESVR HUBLESS

## (undated) DEVICE — SOLUTION IV IRRIG POUR BRL 0.9% SODIUM CHL 2F7124

## (undated) DEVICE — GARMENT,MEDLINE,DVT,INT,CALF,MED, GEN2: Brand: MEDLINE

## (undated) DEVICE — TOTAL TRAY, DB, 100% SILI FOLEY, 16FR 10: Brand: MEDLINE

## (undated) DEVICE — 450 ML BOTTLE OF 0.05% CHLORHEXIDINE GLUCONATE IN 99.95% STERILE WATER FOR IRRIGATION, USP AND APPLICATOR.: Brand: IRRISEPT ANTIMICROBIAL WOUND LAVAGE

## (undated) DEVICE — SUTURE ABSRB BRAID COAT UD CP NO 2 27IN VCRL J195H

## (undated) DEVICE — GLOVE SURG SZ 65 THK91MIL LTX FREE SYN POLYISOPRENE

## (undated) DEVICE — Device

## (undated) DEVICE — DRESSING,GAUZE,XEROFORM,CURAD,5"X9",ST: Brand: CURAD

## (undated) DEVICE — GOWN,SIRUS,NONRNF,SETINSLV,XL,20/CS: Brand: MEDLINE

## (undated) DEVICE — SUTURE VCRL SZ 1 L27IN ABSRB UD CT-1 L36MM 1/2 CIR J261H

## (undated) DEVICE — BLANKET WRM W40.2XL55.9IN IORT LO BODY + MISTRAL AIR

## (undated) DEVICE — LINER SUCT CANSTR 1500CC SEMI RIG W/ POR HYDROPHOBIC SHUT

## (undated) DEVICE — SUTURE ETHLN SZ 3-0 L18IN NONABSORBABLE BLK FS-1 L24MM 3/8 663H

## (undated) DEVICE — YANKAUER,BULB TIP,W/O VENT,RIGID,STERILE: Brand: MEDLINE

## (undated) DEVICE — KIT EVAC 400CC PVC RADPQ Y CONN

## (undated) DEVICE — 3M™ STERI-STRIP™ COMPOUND BENZOIN TINCTURE 40 BAGS/CARTON 4 CARTONS/CASE C1544: Brand: 3M™ STERI-STRIP™

## (undated) DEVICE — EVACUATOR SURG 100CC SIL BLB SUCT RESVR FOR CLS WND DRNGE

## (undated) DEVICE — Z DISCONTINUED BY MEDLINE USE 2711682 TRAY SKIN PREP DRY W/ PREM GLV

## (undated) DEVICE — SUTURE ABSORBABLE BRAIDED 2-0 CT-1 27 IN UD VICRYL J259H

## (undated) DEVICE — FLOSEAL HEMOSTATIC MATRIX, 5 ML: Brand: FLOSEAL

## (undated) DEVICE — SUTURE VCRL SZ 3-0 L27IN ABSRB UD FS-2 L19MM 1/2 CIR J423H

## (undated) DEVICE — SUTURE VCRL SZ 2-0 L27IN ABSRB UD L36MM CP-1 1/2 CIR REV J266H

## (undated) DEVICE — GLOVE ORANGE PI 8   MSG9080

## (undated) DEVICE — EXTRACTOR STPL L10CM REMV SKIN CLSR STPL SQUEEZE HNDL PROX

## (undated) DEVICE — SOLUTION SURG PREP POV IOD 7.5% 4 OZ

## (undated) DEVICE — DRAPE,INSTRUMENT,MAGNETIC,10X16: Brand: MEDLINE

## (undated) DEVICE — CODMAN® SURGICAL PATTIES 1/2" X 1/2" (1.27CM X 1.27CM): Brand: CODMAN®

## (undated) DEVICE — GLOVE ORANGE PI 8 1/2   MSG9085

## (undated) DEVICE — MARKER,SKIN,WI/RULER AND LABELS: Brand: MEDLINE

## (undated) DEVICE — PACK PROCEDURE SURG SET UP SRMC

## (undated) DEVICE — BUR RND FLUT AGRSV 5MM

## (undated) DEVICE — ROYAL SILK SURGICAL GOWN, XXL: Brand: CONVERTORS

## (undated) DEVICE — GOWN,SIRUS,NON REINFRCD,LARGE,SET IN SL: Brand: MEDLINE

## (undated) DEVICE — BLANKET WRM W29.9XL79.1IN UP BODY FORC AIR MISTRAL-AIR

## (undated) DEVICE — 1010 S-DRAPE TOWEL DRAPE 10/BX: Brand: STERI-DRAPE™